# Patient Record
Sex: MALE | Race: WHITE | NOT HISPANIC OR LATINO
[De-identification: names, ages, dates, MRNs, and addresses within clinical notes are randomized per-mention and may not be internally consistent; named-entity substitution may affect disease eponyms.]

---

## 2019-08-06 ENCOUNTER — RX RENEWAL (OUTPATIENT)
Age: 64
End: 2019-08-06

## 2019-09-12 ENCOUNTER — APPOINTMENT (OUTPATIENT)
Dept: GASTROENTEROLOGY | Facility: CLINIC | Age: 64
End: 2019-09-12
Payer: MEDICAID

## 2019-09-12 VITALS
BODY MASS INDEX: 24.16 KG/M2 | DIASTOLIC BLOOD PRESSURE: 90 MMHG | SYSTOLIC BLOOD PRESSURE: 149 MMHG | WEIGHT: 145 LBS | HEART RATE: 61 BPM | HEIGHT: 65 IN

## 2019-09-12 PROCEDURE — 99214 OFFICE O/P EST MOD 30 MIN: CPT

## 2019-09-12 NOTE — PHYSICAL EXAM

## 2019-09-12 NOTE — HISTORY OF PRESENT ILLNESS
[de-identified] : Mr. JOSE R THOMASON is a 64 year old male with history of hepatitis C and cirrhosis. Patient currently feels well. There is no abdominal swelling or lower extremity edema. Patient denies any bleeding or fevers. Patient has not been treated.\par

## 2019-09-12 NOTE — ASSESSMENT
[FreeTextEntry1] : 65 yo male with history of hepatitis C and cirrhosis. Will check labs and ultrasound. Follow up in six months.

## 2019-09-24 ENCOUNTER — OUTPATIENT (OUTPATIENT)
Dept: OUTPATIENT SERVICES | Facility: HOSPITAL | Age: 64
LOS: 1 days | End: 2019-09-24
Payer: MEDICAID

## 2019-09-24 ENCOUNTER — APPOINTMENT (OUTPATIENT)
Dept: ULTRASOUND IMAGING | Facility: CLINIC | Age: 64
End: 2019-09-24
Payer: MEDICAID

## 2019-09-24 DIAGNOSIS — Z00.8 ENCOUNTER FOR OTHER GENERAL EXAMINATION: ICD-10-CM

## 2019-09-24 PROCEDURE — 76700 US EXAM ABDOM COMPLETE: CPT | Mod: 26

## 2019-09-24 PROCEDURE — 76700 US EXAM ABDOM COMPLETE: CPT

## 2019-09-26 LAB
AFP-TM SERPL-MCNC: 8.5 NG/ML
ALBUMIN SERPL ELPH-MCNC: 4.2 G/DL
ALP BLD-CCNC: 66 U/L
ALT SERPL-CCNC: 35 U/L
ANION GAP SERPL CALC-SCNC: 13 MMOL/L
AST SERPL-CCNC: 57 U/L
BASOPHILS # BLD AUTO: 0.03 K/UL
BASOPHILS NFR BLD AUTO: 0.9 %
BILIRUB SERPL-MCNC: 0.8 MG/DL
BUN SERPL-MCNC: 13 MG/DL
CALCIUM SERPL-MCNC: 9 MG/DL
CHLORIDE SERPL-SCNC: 103 MMOL/L
CO2 SERPL-SCNC: 24 MMOL/L
CREAT SERPL-MCNC: 0.85 MG/DL
EOSINOPHIL # BLD AUTO: 0.09 K/UL
EOSINOPHIL NFR BLD AUTO: 2.8 %
HCT VFR BLD CALC: 38.9 %
HGB BLD-MCNC: 13.4 G/DL
IMM GRANULOCYTES NFR BLD AUTO: 0 %
INR PPP: 1.17 RATIO
LYMPHOCYTES # BLD AUTO: 0.71 K/UL
LYMPHOCYTES NFR BLD AUTO: 22 %
MAN DIFF?: NORMAL
MCHC RBC-ENTMCNC: 33.4 PG
MCHC RBC-ENTMCNC: 34.4 GM/DL
MCV RBC AUTO: 97 FL
MONOCYTES # BLD AUTO: 0.33 K/UL
MONOCYTES NFR BLD AUTO: 10.2 %
NEUTROPHILS # BLD AUTO: 2.07 K/UL
NEUTROPHILS NFR BLD AUTO: 64.1 %
PLATELET # BLD AUTO: 72 K/UL
POTASSIUM SERPL-SCNC: 4.1 MMOL/L
PROT SERPL-MCNC: 6.8 G/DL
PT BLD: 13.4 SEC
RBC # BLD: 4.01 M/UL
RBC # FLD: 12.7 %
SODIUM SERPL-SCNC: 140 MMOL/L
WBC # FLD AUTO: 3.23 K/UL

## 2019-10-07 ENCOUNTER — RX RENEWAL (OUTPATIENT)
Age: 64
End: 2019-10-07

## 2019-10-08 ENCOUNTER — RX RENEWAL (OUTPATIENT)
Age: 64
End: 2019-10-08

## 2019-12-03 ENCOUNTER — RX RENEWAL (OUTPATIENT)
Age: 64
End: 2019-12-03

## 2020-02-23 ENCOUNTER — EMERGENCY (EMERGENCY)
Facility: HOSPITAL | Age: 65
LOS: 0 days | Discharge: HOME | End: 2020-02-23
Attending: EMERGENCY MEDICINE
Payer: MEDICARE

## 2020-02-23 VITALS
RESPIRATION RATE: 16 BRPM | HEART RATE: 66 BPM | SYSTOLIC BLOOD PRESSURE: 159 MMHG | DIASTOLIC BLOOD PRESSURE: 95 MMHG | OXYGEN SATURATION: 98 % | TEMPERATURE: 98 F

## 2020-02-23 VITALS — HEIGHT: 65 IN | WEIGHT: 134.92 LBS

## 2020-02-23 DIAGNOSIS — Z88.0 ALLERGY STATUS TO PENICILLIN: ICD-10-CM

## 2020-02-23 DIAGNOSIS — Z91.013 ALLERGY TO SEAFOOD: ICD-10-CM

## 2020-02-23 DIAGNOSIS — Z86.19 PERSONAL HISTORY OF OTHER INFECTIOUS AND PARASITIC DISEASES: ICD-10-CM

## 2020-02-23 DIAGNOSIS — K74.69 OTHER CIRRHOSIS OF LIVER: ICD-10-CM

## 2020-02-23 DIAGNOSIS — M54.5 LOW BACK PAIN: ICD-10-CM

## 2020-02-23 DIAGNOSIS — M54.16 RADICULOPATHY, LUMBAR REGION: ICD-10-CM

## 2020-02-23 PROCEDURE — 99284 EMERGENCY DEPT VISIT MOD MDM: CPT | Mod: 25

## 2020-02-23 PROCEDURE — 99284 EMERGENCY DEPT VISIT MOD MDM: CPT

## 2020-02-23 PROCEDURE — 96372 THER/PROPH/DIAG INJ SC/IM: CPT | Mod: XU

## 2020-02-23 RX ORDER — KETOROLAC TROMETHAMINE 30 MG/ML
30 SYRINGE (ML) INJECTION ONCE
Refills: 0 | Status: DISCONTINUED | OUTPATIENT
Start: 2020-02-23 | End: 2020-02-23

## 2020-02-23 RX ORDER — CYCLOBENZAPRINE HYDROCHLORIDE 10 MG/1
10 TABLET, FILM COATED ORAL ONCE
Refills: 0 | Status: COMPLETED | OUTPATIENT
Start: 2020-02-23 | End: 2020-02-23

## 2020-02-23 RX ORDER — MORPHINE SULFATE 50 MG/1
2 CAPSULE, EXTENDED RELEASE ORAL ONCE
Refills: 0 | Status: DISCONTINUED | OUTPATIENT
Start: 2020-02-23 | End: 2020-02-23

## 2020-02-23 RX ORDER — CYCLOBENZAPRINE HYDROCHLORIDE 10 MG/1
1 TABLET, FILM COATED ORAL
Qty: 15 | Refills: 0
Start: 2020-02-23

## 2020-02-23 RX ADMIN — Medication 30 MILLIGRAM(S): at 18:02

## 2020-02-23 RX ADMIN — Medication 60 MILLIGRAM(S): at 20:01

## 2020-02-23 RX ADMIN — CYCLOBENZAPRINE HYDROCHLORIDE 10 MILLIGRAM(S): 10 TABLET, FILM COATED ORAL at 18:02

## 2020-02-23 RX ADMIN — MORPHINE SULFATE 2 MILLIGRAM(S): 50 CAPSULE, EXTENDED RELEASE ORAL at 19:00

## 2020-02-23 NOTE — ED STATDOCS - CARE PROVIDER_API CALL
Patel Call; PhD)  Neurosurgery  [address], [address]nd [place] [address]  Phone: [phone]  Fax: [phone]  Follow Up Time:     Abel Foote (DO)  Orthopaedic Surgery  [address], [address]nd [place] [address]  Phone: [phone]  Fax: [phone]  Follow Up Time:

## 2020-02-23 NOTE — ED STATDOCS - NS_ ATTENDINGSCRIBEDETAILS _ED_A_ED_FT
I Jordan Robledo MD saw and examined the patient. Scribe documented for me and under my supervision. I have modified the scribe's documentation where necessary to reflect my history, physical exam and other relevant documentations pertinent to the care of the patient.

## 2020-02-23 NOTE — ED ADULT NURSE NOTE - OBJECTIVE STATEMENT
Patient presents to ED complaining of back pain. Patient complaining of LBP radiating down R leg x 6 days. Patient states pain is unrelieved by ibuprofen. Patient denies any recent falls or trauma.

## 2020-02-23 NOTE — ED STATDOCS - CARE PROVIDERS DIRECT ADDRESSES
[email or web address],[email or web address]

## 2020-02-23 NOTE — ED STATDOCS - PATIENT PORTAL LINK FT
You can access the FollowMyHealth Patient Portal offered by [facility] by registering at the following website: [email or web address]. By joining [facility]’s FollowMyHealth portal, you will also be able to view your health information using other applications (apps) compatible with our system.

## 2020-02-23 NOTE — ED STATDOCS - MUSCULOSKELETAL, MLM
TTP paralumbosacral spine. 5/5 strength to b/l LE. TTP paralumbosacral spine. 5/5 strength to b/l LE. no saddle anesthesia. 2+ pulses in bilateral dp arteries.

## 2020-02-23 NOTE — ED STATDOCS - OBJECTIVE STATEMENT
66 y/o male with PMHx of cirrhosis, Hep C presents to the ED c/o low back pain x6 days, now worsening and radiating down RLE and toward abd. Pain aggravated with standing/movement. No trauma or injury noted. No recent imaging. 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 Denies fever, chills, N/V. Hx of drug use. No EtOH use.

## 2020-02-23 NOTE — ED STATDOCS - ATTENDING CONTRIBUTION TO CARE
I Jordan Robledo MD saw and examined the patient. MLP saw and examined the patient under my supervision. I discussed the care of the patient with MLP and agree with MLP's plan, assessment and care of the patient while in the ED.

## 2020-02-23 NOTE — ED STATDOCS - PROGRESS NOTE DETAILS
64 y/o with PMH of cirrhosis, Hep C presents with right sided low back pain x 5 days. Denies trauma. 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 Pain radiates from right back to knee. +difficulty walking and sleeping. Denies fever, chills, nausea, vomiting, loss of bowel/bladder control. No change in pain with advil at home. PE: Well appearing. Cardiac: s1s2, RRR. Lungs: CTAB. ABdomen: NBS x4, soft, nontender. MSK: No midline spinal tenderness. +right lumbar paraspinal tenderness to palpation. No obvious deformity to lower extremities. +Pain with passive ROM in right knee and right hip. Patient able to ambulate with difficulty. A/P: Likely lumbar radicular pain/sciatica. Plan for analgesia, XRs, reassess. - Rafael Casiano PA-C Patient unable to tolerate lying on table for XRs. Returned to [facility] area to be medicated. At this time notes no change in pain following toradol & flexeril. Will provide morphine 2mg SC and send to xray. - Rafael Casiano PA-C Patient unable to tolerate positioning for XR while in ED after morphine. Patient ambulatory. Suspicion for fracture low due to no traumatic mechanism of injury and no history of malignancy. Had discussion with patient, will d/c with PO pain medication and orthopedic referral for MRI. Patient agreeable to plan. Return precautions provided. - Rafael Casiano PA-C

## 2020-02-23 NOTE — ED STATDOCS - NSFOLLOWUPINSTRUCTIONS_ED_ALL_ED_FT
Sciatica     Sciatica is pain, numbness, weakness, or tingling along your sciatic nerve. The sciatic nerve starts in the lower back and goes down the back of each leg. Sciatica happens when this nerve is pinched or has pressure put on it. Sciatica usually goes away on its own or with treatment. Sometimes, sciatica may keep coming back (recur).  Follow these instructions at home:  Medicines     Take over-the-counter and prescription medicines only as told by your doctor.Do not drive or use heavy machinery while taking prescription pain medicine.Managing pain     If directed, put ice on the affected area.  Put ice in a plastic bag.Place a towel between your skin and the bag.Leave the ice on for 20 minutes, 2–3 times a day.After icing, apply heat to the affected area before you exercise or as often as told by your doctor. Use the heat source that your doctor tells you to use, such as a moist heat pack or a heating pad.  Place a towel between your skin and the heat source.Leave the heat on for 20–30 minutes.Remove the heat if your skin turns bright red. This is especially important if you are unable to feel pain, heat, or cold. You may have a greater risk of getting burned.Activity     Return to your normal activities as told by your doctor. Ask your doctor what activities are safe for you.  Avoid activities that make your sciatica worse.Take short rests during the day. Rest in a lying or standing position. This is usually better than sitting to rest.  When you rest for a long time, do some physical activity or stretching between periods of rest.Avoid sitting for a long time without moving. Get up and move around at least one time each hour.Exercise and stretch regularly, as told by your doctor.Do not lift anything that is heavier than 10 lb (4.5 kg) while you have symptoms of sciatica.  Avoid lifting heavy things even when you do not have symptoms.Avoid lifting heavy things over and over.When you lift objects, always lift in a way that is safe for your body. To do this, you should:  Bend your knees.Keep the object close to your body.Avoid twisting.General instructions     Use good posture.  Avoid leaning forward when you are sitting.Avoid hunching over when you are standing.Stay at a healthy weight.Wear comfortable shoes that support your feet. Avoid wearing high heels.Avoid sleeping on a mattress that is too soft or too hard. You might have less pain if you sleep on a mattress that is firm enough to support your back.Keep all follow-up visits as told by your doctor. This is important.Contact a doctor if:  You have pain that:  Wakes you up when you are sleeping.Gets worse when you lie down.Is worse than the pain you have had in the past.Lasts longer than 4 weeks.You lose weight for without trying.Get help right away if:  You cannot control when you pee (urinate) or poop (have a bowel movement).You have weakness in any of these areas and it gets worse.  Lower back.Lower belly (pelvis).Butt (buttocks).Legs.You have redness or swelling of your back.You have a burning feeling when you pee.This information is not intended to replace advice given to you by your health care provider. Make sure you discuss any questions you have with your health care provider.

## 2020-02-23 NOTE — ED ADULT TRIAGE NOTE - CHIEF COMPLAINT QUOTE
Pt states he has 3 days of right sided low back pain radiating down to right knee, unrelieved by Advil. Denies injury

## 2020-03-01 PROCEDURE — G9001: CPT

## 2020-04-01 ENCOUNTER — OUTPATIENT (OUTPATIENT)
Dept: OUTPATIENT SERVICES | Facility: HOSPITAL | Age: 65
LOS: 1 days | End: 2020-04-01
Payer: MEDICARE

## 2020-04-03 DIAGNOSIS — Z71.89 OTHER SPECIFIED COUNSELING: ICD-10-CM

## 2020-04-03 PROBLEM — K74.60 UNSPECIFIED CIRRHOSIS OF LIVER: Chronic | Status: ACTIVE | Noted: 2020-03-12

## 2020-04-08 ENCOUNTER — RX RENEWAL (OUTPATIENT)
Age: 65
End: 2020-04-08

## 2020-08-04 ENCOUNTER — RX RENEWAL (OUTPATIENT)
Age: 65
End: 2020-08-04

## 2021-01-05 ENCOUNTER — RX RENEWAL (OUTPATIENT)
Age: 66
End: 2021-01-05

## 2021-03-31 ENCOUNTER — APPOINTMENT (OUTPATIENT)
Dept: GASTROENTEROLOGY | Facility: CLINIC | Age: 66
End: 2021-03-31
Payer: MEDICARE

## 2021-03-31 VITALS
DIASTOLIC BLOOD PRESSURE: 87 MMHG | BODY MASS INDEX: 24.32 KG/M2 | TEMPERATURE: 98 F | SYSTOLIC BLOOD PRESSURE: 148 MMHG | WEIGHT: 146 LBS | HEIGHT: 65 IN | HEART RATE: 63 BPM

## 2021-03-31 LAB — AFP-TM SERPL-MCNC: 53.8 NG/ML

## 2021-03-31 PROCEDURE — 99072 ADDL SUPL MATRL&STAF TM PHE: CPT

## 2021-03-31 PROCEDURE — 99214 OFFICE O/P EST MOD 30 MIN: CPT

## 2021-03-31 NOTE — ASSESSMENT
[FreeTextEntry1] : 66 yo male with history of hepatitis C and epigastric discomfort. Patient to stop NSAIDs and start PPI. Will obtain labs and ultrasound. Follow up in six weeks.

## 2021-03-31 NOTE — HISTORY OF PRESENT ILLNESS
[de-identified] : Mr. JOSE R THOMASON is a 66 year old male with history of hepatitis C. The patient has been on multiple medications for treatment of sacroiliitis. These included several courses of nonsteroidal agents. Patient has noted some epigastric discomfort since taking his medications. Patient has been unable to be treated for hepatitis C to date. No complaints of abdominal pain, confusion or bleeding.\par

## 2021-03-31 NOTE — PHYSICAL EXAM

## 2021-04-01 LAB
ALBUMIN SERPL ELPH-MCNC: 4.5 G/DL
ALP BLD-CCNC: 64 U/L
ALT SERPL-CCNC: 60 U/L
ANION GAP SERPL CALC-SCNC: 11 MMOL/L
AST SERPL-CCNC: 61 U/L
BASOPHILS # BLD AUTO: 0.01 K/UL
BASOPHILS NFR BLD AUTO: 0.1 %
BILIRUB SERPL-MCNC: 1.2 MG/DL
BUN SERPL-MCNC: 14 MG/DL
CALCIUM SERPL-MCNC: 9.2 MG/DL
CHLORIDE SERPL-SCNC: 97 MMOL/L
CO2 SERPL-SCNC: 25 MMOL/L
CREAT SERPL-MCNC: 0.92 MG/DL
EOSINOPHIL # BLD AUTO: 0.08 K/UL
EOSINOPHIL NFR BLD AUTO: 1.1 %
HCT VFR BLD CALC: 41.9 %
HGB BLD-MCNC: 14.8 G/DL
IMM GRANULOCYTES NFR BLD AUTO: 0.4 %
LYMPHOCYTES # BLD AUTO: 0.75 K/UL
LYMPHOCYTES NFR BLD AUTO: 10.2 %
MAN DIFF?: NORMAL
MCHC RBC-ENTMCNC: 34 PG
MCHC RBC-ENTMCNC: 35.3 GM/DL
MCV RBC AUTO: 96.3 FL
MONOCYTES # BLD AUTO: 0.71 K/UL
MONOCYTES NFR BLD AUTO: 9.7 %
NEUTROPHILS # BLD AUTO: 5.74 K/UL
NEUTROPHILS NFR BLD AUTO: 78.5 %
PLATELET # BLD AUTO: 95 K/UL
POTASSIUM SERPL-SCNC: 4.3 MMOL/L
PROT SERPL-MCNC: 7 G/DL
RBC # BLD: 4.35 M/UL
RBC # FLD: 12.1 %
SODIUM SERPL-SCNC: 134 MMOL/L
WBC # FLD AUTO: 7.32 K/UL

## 2021-04-06 LAB — HCV GENTYP BLD NAA+PROBE: NORMAL

## 2021-04-27 ENCOUNTER — OUTPATIENT (OUTPATIENT)
Dept: OUTPATIENT SERVICES | Facility: HOSPITAL | Age: 66
LOS: 1 days | End: 2021-04-27
Payer: MEDICARE

## 2021-04-27 ENCOUNTER — APPOINTMENT (OUTPATIENT)
Dept: ULTRASOUND IMAGING | Facility: CLINIC | Age: 66
End: 2021-04-27
Payer: MEDICARE

## 2021-04-27 DIAGNOSIS — R10.13 EPIGASTRIC PAIN: ICD-10-CM

## 2021-04-27 PROCEDURE — 76700 US EXAM ABDOM COMPLETE: CPT | Mod: 26

## 2021-04-27 PROCEDURE — 76700 US EXAM ABDOM COMPLETE: CPT

## 2021-05-07 ENCOUNTER — RX RENEWAL (OUTPATIENT)
Age: 66
End: 2021-05-07

## 2021-05-12 ENCOUNTER — APPOINTMENT (OUTPATIENT)
Dept: GASTROENTEROLOGY | Facility: CLINIC | Age: 66
End: 2021-05-12
Payer: MEDICARE

## 2021-05-12 VITALS
TEMPERATURE: 98 F | WEIGHT: 135 LBS | BODY MASS INDEX: 22.49 KG/M2 | HEIGHT: 65 IN | SYSTOLIC BLOOD PRESSURE: 144 MMHG | HEART RATE: 70 BPM | DIASTOLIC BLOOD PRESSURE: 81 MMHG

## 2021-05-12 DIAGNOSIS — R10.13 EPIGASTRIC PAIN: ICD-10-CM

## 2021-05-12 PROCEDURE — 99213 OFFICE O/P EST LOW 20 MIN: CPT

## 2021-05-12 NOTE — ASSESSMENT
[FreeTextEntry1] : 65 yo male with resolved abdominal pain. Will attempt treatment with Mavyret given chronic hepatitis C and cirrhosis. Follow up in three months.

## 2021-05-12 NOTE — HISTORY OF PRESENT ILLNESS
[de-identified] : Mr. JOSE R THOMASON is a 66 year old male with history of abdominal pain and cirrhosis. The patient has noted resolution of symptoms since stopping nonsteroidal medications and taking proton pump inhibitors. Peripheral edema has resolved as well. Laboratory tests and sonogram were consistent with cirrhosis. Patient had AFP that was mildly elevated.\par

## 2021-05-17 PROBLEM — D22.9 MULTIPLE BENIGN NEVI: Status: ACTIVE | Noted: 2021-05-17

## 2021-05-17 PROBLEM — D48.9 NEOPLASM OF UNCERTAIN BEHAVIOR: Status: ACTIVE | Noted: 2021-05-17

## 2021-05-17 PROBLEM — L81.4 LENTIGINES: Status: ACTIVE | Noted: 2021-05-17

## 2021-05-18 ENCOUNTER — NON-APPOINTMENT (OUTPATIENT)
Age: 66
End: 2021-05-18

## 2021-05-18 ENCOUNTER — APPOINTMENT (OUTPATIENT)
Dept: DERMATOLOGY | Facility: CLINIC | Age: 66
End: 2021-05-18
Payer: MEDICARE

## 2021-05-18 DIAGNOSIS — D22.9 MELANOCYTIC NEVI, UNSPECIFIED: ICD-10-CM

## 2021-05-18 DIAGNOSIS — D48.9 NEOPLASM OF UNCERTAIN BEHAVIOR, UNSPECIFIED: ICD-10-CM

## 2021-05-18 DIAGNOSIS — L81.4 OTHER MELANIN HYPERPIGMENTATION: ICD-10-CM

## 2021-05-18 PROCEDURE — 11102 TANGNTL BX SKIN SINGLE LES: CPT

## 2021-05-18 PROCEDURE — 99203 OFFICE O/P NEW LOW 30 MIN: CPT | Mod: 25

## 2021-05-18 NOTE — HISTORY OF PRESENT ILLNESS
[FreeTextEntry1] : spot [de-identified] : 66 year old male with spot on nose. present for years. scabs.

## 2021-05-18 NOTE — PHYSICAL EXAM
[FreeTextEntry3] : AAOx3, pleasant, NAD, no visual lymphadenopathy\par hair, scalp, face, nose, eyelids, ears, lips, oropharynx, neck, chest, abdomen, back, right arm, left arm, nails, and hands examined with all normal findings,\par pertinent findings include:\par \par multiple benign nevi and lentigines\par right nasal ala with red papule

## 2021-05-18 NOTE — ASSESSMENT
[FreeTextEntry1] : 1) benign findings as above- education\par \par 2) Shave bx location right nasal ala\par diagnosis: r/o BCC\par  \par Shave biopsy performed today over above location, risks and benefits discussed including incomplete removal, not enough tissue for diagnosis scarring and infection, informed consent obtained, pictures taken,  cleaned with alcohol and anesthetized with 1%lido+epi, 0.3 cc total, hemostasis obtained with monsels, vaseline and bandaid placed, tolerated well, wound care reviewed, specimen sent to pathology.\par \par

## 2021-05-21 ENCOUNTER — NON-APPOINTMENT (OUTPATIENT)
Age: 66
End: 2021-05-21

## 2021-05-24 ENCOUNTER — NON-APPOINTMENT (OUTPATIENT)
Age: 66
End: 2021-05-24

## 2021-05-24 LAB — CORE LAB BIOPSY: NORMAL

## 2021-06-22 LAB
HBV SURFACE AB SER QL: NONREACTIVE
HBV SURFACE AG SER QL: NONREACTIVE

## 2021-08-04 ENCOUNTER — APPOINTMENT (OUTPATIENT)
Dept: GASTROENTEROLOGY | Facility: CLINIC | Age: 66
End: 2021-08-04
Payer: MEDICARE

## 2021-08-04 ENCOUNTER — APPOINTMENT (OUTPATIENT)
Dept: NEUROSURGERY | Facility: CLINIC | Age: 66
End: 2021-08-04
Payer: MEDICARE

## 2021-08-04 VITALS
HEIGHT: 65 IN | WEIGHT: 120 LBS | BODY MASS INDEX: 19.99 KG/M2 | OXYGEN SATURATION: 99 % | TEMPERATURE: 96 F | SYSTOLIC BLOOD PRESSURE: 161 MMHG | HEART RATE: 62 BPM | DIASTOLIC BLOOD PRESSURE: 91 MMHG

## 2021-08-04 VITALS
HEART RATE: 71 BPM | WEIGHT: 130 LBS | SYSTOLIC BLOOD PRESSURE: 140 MMHG | BODY MASS INDEX: 21.66 KG/M2 | DIASTOLIC BLOOD PRESSURE: 90 MMHG | HEIGHT: 65 IN

## 2021-08-04 DIAGNOSIS — R20.2 PARESTHESIA OF SKIN: ICD-10-CM

## 2021-08-04 DIAGNOSIS — M25.561 PAIN IN RIGHT KNEE: ICD-10-CM

## 2021-08-04 DIAGNOSIS — M54.5 LOW BACK PAIN: ICD-10-CM

## 2021-08-04 PROCEDURE — 99213 OFFICE O/P EST LOW 20 MIN: CPT

## 2021-08-04 PROCEDURE — 99203 OFFICE O/P NEW LOW 30 MIN: CPT

## 2021-08-04 NOTE — HISTORY OF PRESENT ILLNESS
[de-identified] : Mr. JOSE R THOMASON is a 66 year old male with history of hepatitis C. Patient is unable to obtain therapy. Patient has no complaints of abdominal pain or lower extremity edema. No other significant changes in medical history. Patient has been avoiding nonsteroidal agents as discussed in the past. \par

## 2021-08-04 NOTE — ASSESSMENT
[FreeTextEntry1] : 67 yo male with stable cirrhosis from hepatitis C. Will obtain labs. Follow up in three months and obtain ultrasound at that time.

## 2021-08-05 ENCOUNTER — LABORATORY RESULT (OUTPATIENT)
Age: 66
End: 2021-08-05

## 2021-08-05 PROBLEM — M54.5 LUMBAGO: Status: ACTIVE | Noted: 2021-08-05

## 2021-08-05 PROBLEM — M25.561 RIGHT KNEE PAIN: Status: ACTIVE | Noted: 2021-08-05

## 2021-08-05 PROBLEM — R20.2 PARESTHESIA: Status: ACTIVE | Noted: 2021-08-05

## 2021-08-05 PROBLEM — M25.561 RIGHT KNEE PAIN: Status: RESOLVED | Noted: 2021-08-05 | Resolved: 2021-08-05

## 2021-08-05 NOTE — CONSULT LETTER
[Dear  ___] : Dear  [unfilled], [Courtesy Letter:] : I had the pleasure of seeing your patient, [unfilled], in my office today. [Sincerely,] : Sincerely, [FreeTextEntry2] : Caroline Arenas MD\par [address]\par [place] [address] [FreeTextEntry1] : Rafael Farmer is a 66-year-old male with a history of cirrhosis who presents today with lower back and mainly right knee symptoms.  Patient reports symptoms started in 2019 after lifting a heavy object.  Patient reports symptoms were exacerbated after shoveling snow in January 2020.  Patient reports a very mild and slight lower back intermittent soreness.  He reports a heavy throbbing constant 10 out of 10 pain localized to his right knee accompanied with numbness and tingling.  He denies any lower extremity shooting pains or weakness.  He denies any tripping over his feet or difficulties with walking.  He denies bowel or bladder dysfunction or saddle anesthesia.  Patient reports 10 mg of oxycodone provides him with relief of his right knee pain.  Although patients severe and main pain is in the right knee he has noted that when lying on his stomach this produces right lower back pain with a right leg pulling sensation pain.\par \par Patient is under the care of a pain management doctor which he has been seeing since January 2021.  Patient indicates he had received an x-ray of the knee at that time which was negative. He had underwent a total of 3 epidural injections the most recent in May 20, 2020.  This has not provided him with any relief.  He underwent 4 weeks of physical therapy with no relief noted.\par \par Patient underwent x-ray of the right knee performed on 6/4/2021 which indicated no evidence of acute displaced fracture. Patient underwent MRI of the lumbar spine performed in March 2020 at [facility].  Disc bulges and herniations noted throughout. \par \par Patient is alert and oriented.  No distress noted.  Sensation to light touch to lower extremities equal and normal.  Bilateral absent Achilles tendon reflexes.  Bilateral patellar reflexes present and equal.  Strength to bilateral legs 5/5.  Negative clonus. Negative straight leg test.\par \par I provided the patient with a prescription for an MRI of the right knee given that his severe pain is concentrated in his right knee. I have also provided the patient with a referral for orthopedic surgeon. We will follow over the phone once patient has been evaluated by the orthopedist surgeon.  Patient aware to call with any further questions or concerns or with any new or worsening symptoms. [FreeTextEntry3] : Samantha Staley, MSN, FNP-BC\par Nurse Practitioner \par Neurosurgery \par [address] [place] [address], [address]nd floor \[place] [address]\par Office: [phone]\par Fax: [phone]\par

## 2021-08-05 NOTE — REVIEW OF SYSTEMS
[Recent Weight Loss (___ Lbs)] : recent [unfilled] ~Ulb weight loss [Depression] : depression [Negative] : Heme/Lymph [Difficulty Walking] : no difficulty walking

## 2021-08-06 LAB
AFP-TM SERPL-MCNC: 122 NG/ML
ALBUMIN SERPL ELPH-MCNC: 4 G/DL
ALP BLD-CCNC: 82 U/L
ALT SERPL-CCNC: 60 U/L
ANION GAP SERPL CALC-SCNC: 14 MMOL/L
AST SERPL-CCNC: 73 U/L
BASOPHILS # BLD AUTO: 0.02 K/UL
BASOPHILS NFR BLD AUTO: 0.4 %
BILIRUB SERPL-MCNC: 0.9 MG/DL
BUN SERPL-MCNC: 13 MG/DL
CALCIUM SERPL-MCNC: 9.1 MG/DL
CHLORIDE SERPL-SCNC: 99 MMOL/L
CO2 SERPL-SCNC: 24 MMOL/L
CREAT SERPL-MCNC: 0.69 MG/DL
EOSINOPHIL # BLD AUTO: 0.07 K/UL
EOSINOPHIL NFR BLD AUTO: 1.5 %
HCT VFR BLD CALC: 41.3 %
HGB BLD-MCNC: 14.6 G/DL
IMM GRANULOCYTES NFR BLD AUTO: 0.4 %
INR PPP: 1.1 RATIO
LYMPHOCYTES # BLD AUTO: 0.63 K/UL
LYMPHOCYTES NFR BLD AUTO: 13.4 %
MAN DIFF?: NORMAL
MCHC RBC-ENTMCNC: 35.4 GM/DL
MCHC RBC-ENTMCNC: 36.1 PG
MCV RBC AUTO: 102.2 FL
MONOCYTES # BLD AUTO: 0.74 K/UL
MONOCYTES NFR BLD AUTO: 15.7 %
NEUTROPHILS # BLD AUTO: 3.23 K/UL
NEUTROPHILS NFR BLD AUTO: 68.6 %
PLATELET # BLD AUTO: 90 K/UL
POTASSIUM SERPL-SCNC: 4.7 MMOL/L
PROT SERPL-MCNC: 6.4 G/DL
PT BLD: 12.9 SEC
RBC # BLD: 4.04 M/UL
RBC # FLD: 13 %
SODIUM SERPL-SCNC: 137 MMOL/L
WBC # FLD AUTO: 4.71 K/UL

## 2021-09-10 ENCOUNTER — RX RENEWAL (OUTPATIENT)
Age: 66
End: 2021-09-10

## 2021-09-16 ENCOUNTER — NON-APPOINTMENT (OUTPATIENT)
Age: 66
End: 2021-09-16

## 2021-11-01 ENCOUNTER — RX RENEWAL (OUTPATIENT)
Age: 66
End: 2021-11-01

## 2021-11-04 ENCOUNTER — APPOINTMENT (OUTPATIENT)
Dept: GASTROENTEROLOGY | Facility: CLINIC | Age: 66
End: 2021-11-04
Payer: MEDICARE

## 2021-11-04 VITALS
WEIGHT: 125 LBS | DIASTOLIC BLOOD PRESSURE: 80 MMHG | HEART RATE: 66 BPM | SYSTOLIC BLOOD PRESSURE: 142 MMHG | BODY MASS INDEX: 20.83 KG/M2 | HEIGHT: 65 IN

## 2021-11-04 PROCEDURE — 99213 OFFICE O/P EST LOW 20 MIN: CPT

## 2021-11-04 NOTE — HISTORY OF PRESENT ILLNESS
[de-identified] : Mr. JOSE R THOMASON is a 66 year old male with history of cirrhosis and hepatitis C. Patient is almost completed her course of Harvoni. No complaints of pruritus, abdominal pain. Patient has been tolerating therapy well. There has been no significant changes medical history. No complaints of lower extremity edema.\par

## 2021-11-04 NOTE — ASSESSMENT
[FreeTextEntry1] : 65 yo male with history of compensated cirrhosis from hepatitis C. Will obtain labs, and follow up in two months to obtain viral load for hepatitis C.

## 2021-11-08 ENCOUNTER — LABORATORY RESULT (OUTPATIENT)
Age: 66
End: 2021-11-08

## 2021-11-09 LAB
AFP-TM SERPL-MCNC: 196 NG/ML
ALBUMIN SERPL ELPH-MCNC: 4.1 G/DL
ALP BLD-CCNC: 79 U/L
ALT SERPL-CCNC: 11 U/L
ANION GAP SERPL CALC-SCNC: 11 MMOL/L
AST SERPL-CCNC: 33 U/L
BASOPHILS # BLD AUTO: 0 K/UL
BASOPHILS NFR BLD AUTO: 0 %
BILIRUB SERPL-MCNC: 0.6 MG/DL
BUN SERPL-MCNC: 11 MG/DL
CALCIUM SERPL-MCNC: 8.9 MG/DL
CHLORIDE SERPL-SCNC: 102 MMOL/L
CO2 SERPL-SCNC: 26 MMOL/L
CREAT SERPL-MCNC: 0.77 MG/DL
EOSINOPHIL # BLD AUTO: 0.11 K/UL
EOSINOPHIL NFR BLD AUTO: 2.6 %
HCT VFR BLD CALC: 37.5 %
HGB BLD-MCNC: 13.1 G/DL
LYMPHOCYTES # BLD AUTO: 0.63 K/UL
LYMPHOCYTES NFR BLD AUTO: 14.8 %
MAN DIFF?: NORMAL
MCHC RBC-ENTMCNC: 34.9 GM/DL
MCHC RBC-ENTMCNC: 35.1 PG
MCV RBC AUTO: 100.5 FL
MONOCYTES # BLD AUTO: 0.41 K/UL
MONOCYTES NFR BLD AUTO: 9.6 %
NEUTROPHILS # BLD AUTO: 3.11 K/UL
NEUTROPHILS NFR BLD AUTO: 73 %
PLATELET # BLD AUTO: 81 K/UL
POTASSIUM SERPL-SCNC: 4.4 MMOL/L
PROT SERPL-MCNC: 6.4 G/DL
RBC # BLD: 3.73 M/UL
RBC # FLD: 12.1 %
SODIUM SERPL-SCNC: 138 MMOL/L
WBC # FLD AUTO: 4.26 K/UL

## 2021-11-10 LAB
HCV RNA SERPL NAA DL=5-ACNC: NOT DETECTED
HCV RNA SERPL NAA+PROBE-LOG IU: NOT DETECTED LOG10IU/ML

## 2021-11-29 ENCOUNTER — OUTPATIENT (OUTPATIENT)
Dept: OUTPATIENT SERVICES | Facility: HOSPITAL | Age: 66
LOS: 1 days | End: 2021-11-29
Payer: MEDICARE

## 2021-11-29 ENCOUNTER — APPOINTMENT (OUTPATIENT)
Dept: MRI IMAGING | Facility: CLINIC | Age: 66
End: 2021-11-29
Payer: MEDICARE

## 2021-11-29 DIAGNOSIS — R77.2 ABNORMALITY OF ALPHAFETOPROTEIN: ICD-10-CM

## 2021-11-29 PROCEDURE — G1004: CPT

## 2021-11-29 PROCEDURE — 74183 MRI ABD W/O CNTR FLWD CNTR: CPT | Mod: 26,MG

## 2021-11-29 PROCEDURE — A9585: CPT

## 2021-11-29 PROCEDURE — 74183 MRI ABD W/O CNTR FLWD CNTR: CPT | Mod: MG

## 2021-12-10 PROBLEM — C22.0 HEPATOMA: Status: ACTIVE | Noted: 2021-12-10

## 2021-12-13 ENCOUNTER — NON-APPOINTMENT (OUTPATIENT)
Age: 66
End: 2021-12-13

## 2021-12-13 ENCOUNTER — APPOINTMENT (OUTPATIENT)
Dept: TRANSPLANT | Facility: CLINIC | Age: 66
End: 2021-12-13
Payer: MEDICARE

## 2021-12-13 ENCOUNTER — APPOINTMENT (OUTPATIENT)
Dept: TRANSPLANT | Facility: CLINIC | Age: 66
End: 2021-12-13

## 2021-12-13 ENCOUNTER — LABORATORY RESULT (OUTPATIENT)
Age: 66
End: 2021-12-13

## 2021-12-13 VITALS
HEART RATE: 68 BPM | DIASTOLIC BLOOD PRESSURE: 84 MMHG | RESPIRATION RATE: 16 BRPM | BODY MASS INDEX: 21.33 KG/M2 | WEIGHT: 128 LBS | OXYGEN SATURATION: 100 % | SYSTOLIC BLOOD PRESSURE: 152 MMHG | TEMPERATURE: 96 F | HEIGHT: 65 IN

## 2021-12-13 DIAGNOSIS — C22.0 LIVER CELL CARCINOMA: ICD-10-CM

## 2021-12-13 PROCEDURE — 99205 OFFICE O/P NEW HI 60 MIN: CPT

## 2021-12-13 RX ORDER — LEDIPASVIR AND SOFOSBUVIR 90; 400 MG/1; MG/1
90-400 TABLET, FILM COATED ORAL DAILY
Qty: 84 | Refills: 0 | Status: DISCONTINUED | COMMUNITY
Start: 2021-06-08 | End: 2021-12-13

## 2021-12-13 RX ORDER — GLECAPREVIR AND PIBRENTASVIR 40; 100 MG/1; MG/1
100-40 TABLET, FILM COATED ORAL DAILY
Qty: 168 | Refills: 0 | Status: DISCONTINUED | COMMUNITY
Start: 2021-05-12 | End: 2021-12-13

## 2021-12-13 RX ORDER — PANTOPRAZOLE 40 MG/1
40 TABLET, DELAYED RELEASE ORAL DAILY
Qty: 60 | Refills: 3 | Status: DISCONTINUED | COMMUNITY
Start: 2021-03-31 | End: 2021-12-13

## 2021-12-13 RX ORDER — FUROSEMIDE 20 MG/1
20 TABLET ORAL DAILY
Qty: 60 | Refills: 3 | Status: DISCONTINUED | COMMUNITY
Start: 2021-05-04 | End: 2021-12-13

## 2021-12-13 NOTE — PHYSICAL EXAM
[Splenomegaly] : splenomegaly [Liver Palpable ___ Finger Breadths Below Costal Margin] : Liver edge was palpable [unfilled] finger breadths below costal margin [General Appearance - Alert] : alert [General Appearance - In No Acute Distress] : in no acute distress [Outer Ear] : the ears and nose were normal in appearance [Neck Appearance] : the appearance of the neck was normal [Apical Impulse] : the apical impulse was normal [Heart Rate And Rhythm] : heart rate was normal and rhythm regular [Heart Sounds] : normal S1 and S2 [Murmurs] : no murmurs [Arterial Pulses Carotid] : carotid pulses were normal with no bruits [Full Pulse] : the pedal pulses are present [No CVA Tenderness] : no ~M costovertebral angle tenderness [Cranial Nerves] : cranial nerves 2-12 were intact [Oriented To Time, Place, And Person] : oriented to person, place, and time [Affect] : the affect was normal [Scleral Icterus] : No Scleral Icterus [Spider Angioma] : No spider angioma(s) were observed [Asterixis] : no asterixis observed [Jaundice] : No jaundice [FreeTextEntry1] : 0.5cm umbilical hernia, reducible, spleen 2fb below CM Liver plalpable 1cm below cm

## 2021-12-13 NOTE — ASSESSMENT
[FreeTextEntry1] : Patient was explained alternatives, benefits and risk of liver transplantation, including but not limited to infection, bleeding, hepatic artery or portal vein thrombosis, primary dysfunction or primary non-function of the liver allograft, cardiopulmonary arrest, intra-operative death and other surgical, medical and psychosocial risks as outlined in the evaluation consent form. Patient understands these risks and is willing to proceed with liver transplantation.\par Patient was also explained the need to remain on lifelong anti-rejection medications.  We discussed the risks and side effects of immunosuppressive medications including, but not limited to infection, cancer, weight gain, new onset or worsening of diabetes or hypertension in a temporary or permanent state, kidney dysfunction, water retention, back pain, constipation, diarrhea, dizziness, headache, joint pain, loss of appetite, nausea, stomach pain or upset, trouble sleeping, vomiting, and mental or mood changes.  An overview of the follow-up protocol was reviewed including outpatient visits, blood tests and the potential for hospital readmission. The patient understands these risks and is willing to proceed with liver transplantation.\par \par We also discussed the available donor organ pool. We discussed the assessment of the  donor including age, cause of death, cardiac arrest, electrolyte abnormalities, course and length of hospital stay, use of vasopressors, hepatitis and HIV testing. We reviewed organ donor risk factors that could affect the success of the graft or the health of the patient, including, but not limited to, the donor's history, condition or age of the organs used, or the patient's potential risk of rosemary the human immunodeficiency virus and other infectious diseases if the disease cannot be detected in an infected donor.  We discussed and defined the option of an extended criteria for cadaveric donors (Hepatitis B core Ab positive donor, Hepatitis C Ab positive donor, steatosis, older donors, split livers and DCD donors) and early and late outcomes of graft survival after transplantation.\par \par The options of  donor liver transplantation vs. live donor liver transplantation were discussed. Differences between donation after cardiac death (DCD) compared to donation after brain death (DBD) liver transplantation were also fully disclosed and included lower graft survival rates, the increased incidence of hepatic artery stenosis, bile duct injury, ischemic cholangiopathy and increased re transplant rates seen in recipients of DCD donor livers.\par The [employer].S. Public Health Services (PHS) has set risk criteria for the transmission of viruses from donors to recipients.  A donor considered to have PHS risk criteria for a preventable viral infection based on their history which may suggest socially increased risk behaviors, was discussed. The patient is aware that if a PHS risk criteria donor is offered to the candidate, the transplant team will discuss the specifics to assist with making an informed decision. We discussed our post-transplant protocol for serology testing. \par \par The patient was made aware that it is against the law to be paid or to pay to donate an organ.  If any money was given or will be given in exchange for receiving an organ, the patient may be subject to criminal prosecution, and any insurance coverage may no longer apply and patient may become personally responsible for all the health care costs associated with the donation, and private health information will be available to law enforcement agencies.\par We explained that we store vessels for subsequent later use in transplants.  Again, we discussed the extensive testing done on  donors prior to donation, however despite an extensive evaluation on the donor, there is potential risk a recipient may contract infectious diseases (HIV or Hepatitis) or cancer if they cannot be detected in the donor. In the cases where PHS risk criteria donor vessels are used, we test the recipient per protocol between 1-2 months post-transplant for any potential infectious disease transmission. The patient understands that there is the potential of use of  donor vessels and PHS risk criteria donor vessels. The patient understands these risks and is willing to receive potentially PHS risk criteria donor vessels.\par \par We also discussed the MELD allocation system in depth and the one-year observed and expected patient and graft survival rates according to data from the Scientific Registry for Transplant Recipients. These center specific outcomes were provided in comparison to the national one-year averages as described in the evaluation consent forms.\par \par Prior to signing consent, patient was given an opportunity to ask questions.  After all concerns were addressed, informed consent was signed. Patient is aware that they may withdraw their consent for transplantation at any time.  Further, the patient is aware of the right to refuse an organ offer without penalty at any time. Pt has acknowledged understanding of PHS risk criteria donor. Did not consented to receive an organ from a Hepatitis C infected donor\par \par \par \par 65 yo male with history of compensated cirrhosis from hepatitis C treated with Harvoni 21. Had one decompensating event around  requiring LVP and had EV bleed.  Has never required another LVP, on nadolol. He has a newly dx HCC  4.0 cm seg 5/. He is oxycodone dependent d/t right knee and lower back pain. \par \par Consented for liver transplant \par \par Plan: Excellent candidate.\par -Will refer for liver directed therapy  , spoke with Dr. Fung\par -Plan to meet the team today \par -Will send for routine testing including dental, never had a colonoscopy.  \par

## 2021-12-13 NOTE — HISTORY OF PRESENT ILLNESS
[de-identified] : Mr. JOSE R THOMASON is a 66 year old male referred by Dr. Marcus Elias for newly dx HCC. \par He has a history of cirrhosis and hepatitis C. Patient just completed his course of Harvoni, his most  recent PCR (11/8/21) not detected.  \par \par He has decompensated disease in the form of ascites. He required 1 paracentesis (2L) about 2014, the same year he reports EV bleed at [facility] [place]..  Reports this was his last hospitalization.  \par \par Additional PMHx includes OA in right knee, lumbar disc degeneration requiring Opioids prescribed by a pain specialist.  BCC right nares. \par \par His most recent MRI shows a 4.0 x 2.9 x 3.0 cm HCC to segment 5/6.  . \par \par LFTs are normal, MELD 7. \par \par Covid Vaccinated Pfizer and booster (3/28/21, 4/19/21, 10/22/21) \par \par Social Hx:  Has a significant other Juanita, together 31 y/o dtr 2 grandchildren. Is a musician, plays bass guitar.  \par \par ETOH Hx: Quit drinking 25 yrs ago. 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 \par \par Additional small enhancing focus in the superior right hepatic lobe, which may represent a metastatic lesion or synchronous HCC.\par \par Location: Segment 5/6\par Size: 4.0 x 2.9 x 3.0 CM.\par Enhancement: Yes; Nodular/Masslike/Thick irregular.\par Washout: YES\par LI-RADS v 2018 Category: 5\par \par LABS 11/8/21 -Na 138\par TB 0.6, AST 33, ALT 11, ALP 79, Cr 0.77 \par  \par WBC 4.26, H/H 13.1/37.5, PLT 81

## 2021-12-17 LAB
ABO + RH PNL BLD: NORMAL
ABO + RH PNL BLD: NORMAL
AFP-TM SERPL-MCNC: 215 NG/ML
ALBUMIN SERPL ELPH-MCNC: 4.3 G/DL
ALP BLD-CCNC: 79 U/L
ALT SERPL-CCNC: 14 U/L
ANION GAP SERPL CALC-SCNC: 11 MMOL/L
AST SERPL-CCNC: 33 U/L
BASOPHILS # BLD AUTO: 0.02 K/UL
BASOPHILS NFR BLD AUTO: 0.5 %
BILIRUB SERPL-MCNC: 1 MG/DL
BUN SERPL-MCNC: 12 MG/DL
CALCIUM SERPL-MCNC: 9.4 MG/DL
CHLORIDE SERPL-SCNC: 99 MMOL/L
CHOLEST SERPL-MCNC: 166 MG/DL
CMV IGG SERPL QL: <0.2 U/ML
CMV IGG SERPL-IMP: NEGATIVE
CO2 SERPL-SCNC: 26 MMOL/L
COVID-19 SPIKE DOMAIN ANTIBODY INTERPRETATION: POSITIVE
CREAT SERPL-MCNC: 0.89 MG/DL
CREAT SPEC-SCNC: 85 MG/DL
CREAT/PROT UR: 0.1 RATIO
EBV DNA SERPL NAA+PROBE-ACNC: NOT DETECTED IU/ML
EBV EA AB SER IA-ACNC: >150 U/ML
EBV EA AB TITR SER IF: POSITIVE
EBV EA IGG SER QL IA: >600 U/ML
EBV EA IGG SER-ACNC: POSITIVE
EBV EA IGM SER IA-ACNC: POSITIVE
EBV PATRN SPEC IB-IMP: NORMAL
EBV VCA IGG SER IA-ACNC: >750 U/ML
EBV VCA IGM SER QL IA: 70.7 U/ML
EOSINOPHIL # BLD AUTO: 0.1 K/UL
EOSINOPHIL NFR BLD AUTO: 2.3 %
EPSTEIN-BARR VIRUS CAPSID ANTIGEN IGG: POSITIVE
ESTIMATED AVERAGE GLUCOSE: 100 MG/DL
GLUCOSE SERPL-MCNC: 130 MG/DL
HAV IGM SER QL: NONREACTIVE
HBA1C MFR BLD HPLC: 5.1 %
HBV CORE IGG+IGM SER QL: NONREACTIVE
HBV SURFACE AB SER QL: NONREACTIVE
HBV SURFACE AB SERPL IA-ACNC: <3 MIU/ML
HBV SURFACE AG SER QL: NONREACTIVE
HCT VFR BLD CALC: 38.9 %
HCV AB SER QL: REACTIVE
HCV S/CO RATIO: 13.42 S/CO
HDLC SERPL-MCNC: 60 MG/DL
HEPATITIS A IGG ANTIBODY: NONREACTIVE
HGB BLD-MCNC: 13.5 G/DL
HIV1+2 AB SPEC QL IA.RAPID: NONREACTIVE
HSV 1+2 IGG SER IA-IMP: NEGATIVE
HSV 1+2 IGG SER IA-IMP: POSITIVE
HSV1 IGG SER QL: 0.19 INDEX
HSV2 IGG SER QL: 1.96 INDEX
IMM GRANULOCYTES NFR BLD AUTO: 0.2 %
INR PPP: 1.19 RATIO
LDLC SERPL CALC-MCNC: 93 MG/DL
LYMPHOCYTES # BLD AUTO: 0.71 K/UL
LYMPHOCYTES NFR BLD AUTO: 16.2 %
MAGNESIUM SERPL-MCNC: 1.9 MG/DL
MAN DIFF?: NORMAL
MCHC RBC-ENTMCNC: 33.3 PG
MCHC RBC-ENTMCNC: 34.7 GM/DL
MCV RBC AUTO: 96 FL
MONOCYTES # BLD AUTO: 0.45 K/UL
MONOCYTES NFR BLD AUTO: 10.3 %
NEUTROPHILS # BLD AUTO: 3.1 K/UL
NEUTROPHILS NFR BLD AUTO: 70.5 %
NONHDLC SERPL-MCNC: 105 MG/DL
PHOSPHATE SERPL-MCNC: 3.3 MG/DL
PLATELET # BLD AUTO: 90 K/UL
POTASSIUM SERPL-SCNC: 4.6 MMOL/L
PROT SERPL-MCNC: 6.9 G/DL
PROT UR-MCNC: 6 MG/DL
PSA SERPL-MCNC: 1.95 NG/ML
PT BLD: 14 SEC
RBC # BLD: 4.05 M/UL
RBC # FLD: 12.5 %
RUBV IGG FLD-ACNC: 29.1 INDEX
RUBV IGG SER-IMP: POSITIVE
SARS-COV-2 AB SERPL IA-ACNC: >250 U/ML
SODIUM SERPL-SCNC: 135 MMOL/L
T GONDII AB SER-IMP: NEGATIVE
T GONDII IGG SER QL: <3 IU/ML
T PALLIDUM AB SER QL IA: NEGATIVE
TRIGL SERPL-MCNC: 63 MG/DL
VZV AB TITR SER: POSITIVE
VZV IGG SER IF-ACNC: >4000 INDEX
WBC # FLD AUTO: 4.39 K/UL

## 2021-12-21 ENCOUNTER — NON-APPOINTMENT (OUTPATIENT)
Age: 66
End: 2021-12-21

## 2021-12-21 LAB
DRUG ABUSE PANEL-9, SERUM: ABNORMAL
M TB IFN-G BLD-IMP: NEGATIVE
PHOSPHATIDYETHANOL (PETH), WHOLE BLOOD: NEGATIVE NG/ML
QUANTIFERON TB PLUS MITOGEN MINUS NIL: 3.61 IU/ML
QUANTIFERON TB PLUS NIL: 0.03 IU/ML
QUANTIFERON TB PLUS TB1 MINUS NIL: 0.04 IU/ML
QUANTIFERON TB PLUS TB2 MINUS NIL: 0.05 IU/ML

## 2021-12-22 ENCOUNTER — RX RENEWAL (OUTPATIENT)
Age: 66
End: 2021-12-22

## 2021-12-23 ENCOUNTER — APPOINTMENT (OUTPATIENT)
Dept: INTERVENTIONAL RADIOLOGY/VASCULAR | Facility: CLINIC | Age: 66
End: 2021-12-23
Payer: MEDICARE

## 2021-12-23 DIAGNOSIS — Z87.19 PERSONAL HISTORY OF OTHER DISEASES OF THE DIGESTIVE SYSTEM: ICD-10-CM

## 2021-12-23 DIAGNOSIS — Z87.898 PERSONAL HISTORY OF OTHER SPECIFIED CONDITIONS: ICD-10-CM

## 2021-12-23 DIAGNOSIS — M17.10 UNILATERAL PRIMARY OSTEOARTHRITIS, UNSPECIFIED KNEE: ICD-10-CM

## 2021-12-23 DIAGNOSIS — M51.26 OTHER INTERVERTEBRAL DISC DISPLACEMENT, LUMBAR REGION: ICD-10-CM

## 2021-12-23 PROCEDURE — 99204 OFFICE O/P NEW MOD 45 MIN: CPT | Mod: 95

## 2021-12-23 RX ORDER — CHLORHEXIDINE GLUCONATE 4 %
325 (65 FE) LIQUID (ML) TOPICAL DAILY
Refills: 0 | Status: ACTIVE | COMMUNITY
Start: 2021-12-23

## 2021-12-23 RX ORDER — PROPRANOLOL HYDROCHLORIDE 10 MG/1
10 TABLET ORAL
Qty: 180 | Refills: 5 | Status: DISCONTINUED | COMMUNITY
Start: 2019-10-07 | End: 2021-12-23

## 2021-12-28 NOTE — HISTORY OF PRESENT ILLNESS
[FreeTextEntry1] : This is a 66 year old male with pmhx of  OA in right knee, lumbar disc degeneration requiring Opioids prescribed by a pain specialist. BCC right nares, HCV, cirrhosis, and newly diagnosed HCC who presents today for consultation for liver directed therapy.  \par Pt has a history of cirrhosis and hepatitis C. Patient just completed his course of Harvoni, his most recent PCR (11/8/21) not detected. \par \par He has decompensated disease in the form of ascites. He required 1 paracentesis (2L) about 2014, the same year he reports EV bleed at [facility].. Reports this was his last hospitalization. \par  \par \par Social Hx: Has a significant other Juanita, together 31 y/o dtr 2 grandchildren. Is a musician, plays bass guitar. \par \par ETOH Hx: Quit drinking 25 yrs ago. 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

## 2021-12-28 NOTE — DATA REVIEWED
[FreeTextEntry1] : \par \par \par \par EXAM: MR ABDOMEN WAW IC\par \par \par PROCEDURE DATE: 11/29/2021\par \par \par \par INTERPRETATION: CLINICAL INFORMATION: History of elevated AFP. Rule out HCC.\par \par COMPARISON: Abdominal ultrasound 4/27/2021. Noncontrast CT abdomen pelvis 2/18/2014.\par \par CONTRAST/COMPLICATIONS:\par IV Contrast: Gadavist 10 cc administered 0 cc discarded\par Oral Contrast: NONE\par Complications: None reported at time of study completion\par \par PROCEDURE:\par MRI of the abdomen was performed.\par MRCP was performed.\par \par FINDINGS:\par LOWER CHEST: Within normal limits.\par \par LIVER: Markedly nodular and shrunken liver contour with prominence of the left lobe laterally, and mild heterogeneous T2 signal, compatible with cirrhosis. There are associated findings of portal hypertension, including recanalization of the periumbilical vein, lower esophageal/gastric varices, and mild splenomegaly.\par \par Within the inferior aspect of the right hepatic lobe (segment 5/6) there is a 4.0 x 2.9 x 3.0 cm T2 intermediate intensity lesion, which is hyperintense to the cirrhotic liver. This lesion demonstrates intracellular lipid and early arterial postcontrast enhancement with an enhancing pseudocapsule, with washout and restricted diffusion, and is compatible with hepatocellular carcinoma. An additional small 6 mm T2 hyper intense focus noted in the superior aspect of the right hepatic lobe (series 3 image 9), which demonstrates peripheral enhancement on early arterial and delayed imaging.\par BILE DUCTS: Normal caliber. Limited MRCP evaluation due to technique.\par GALLBLADDER: Within normal limits.\par SPLEEN: Enlarged to 13.8 cm in craniocaudad dimension. No visualized lesions.\par PANCREAS: The visualized lesions. Normal caliber main pancreatic duct.\par ADRENALS: Within normal limits.\par KIDNEYS/URETERS: Right renal upper pole cyst 1.9 cm with a single thin septation. Additional smaller left parapelvic 0.5 cm cyst. Otherwise, the kidneys are unremarkable in appearance, signal characteristics, and enhancement.\par \par VISUALIZED PORTIONS:\par BOWEL: Within normal limits.\par PERITONEUM: Small volume of perihepatic ascites.\par VESSELS: Nonaneurysmal abdominal aorta. The celiac artery, superior mesenteric artery, inferior mesenteric artery, and bilateral renal arteries are patent. 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.\par RETROPERITONEUM/LYMPH NODES: No lymphadenopathy.\par ABDOMINAL WALL: Within normal limits.\par BONES: Within normal limits.\par \par IMPRESSION:\par Findings of of cirrhosis and portal hypertension and a right hepatic lobe 4.0 cm mass with MR characteristics diagnostic for hepatocellular carcinoma.\par \par Additional small enhancing focus in the superior right hepatic lobe, which may represent a metastatic lesion or synchronous HCC.\par \par Location: Segment 5/6\par Size: 4.0 x 2.9 x 3.0 CM.\par Enhancement: Yes; Nodular/Masslike/Thick irregular.\par Washout: YES\par LI-RADS v 2018 Category: 5\par \par \par \par

## 2021-12-28 NOTE — ASSESSMENT
[FreeTextEntry1] : This is a 66 year old male with pmhx of  OA in right knee, lumbar disc degeneration requiring Opioids prescribed by a pain specialist. BCC right nares, HCV, cirrhosis, and newly diagnosed HCC who presents today for consultation for liver directed therapy. \par \par 1.  HCC\par - MR from 11/29/21 demonstrates a 4.0 2.9 x 3.0 cm Segment 5/6 lesion\par - imaging reviewed and discussed with pt\par - Recommending treatment with radioembolization\par - I reviewed the procedure, including the mapping angiogram, risks (infection, bleeding, elevated LFTs, MARK), benefits, alternatives, and expected post procedure course.\par - I reviewed post procedure radiation safety precautions\par - chest CT \par - NM bone scan\par - reviewed PPI/Medrol use, will send to pharmacy once have date planned\par \par 2.  Chronic Pain\par - on oxycodone for degenerative lumbar disease\par - will make anesthesia aware\par \par \par I have provided the patient the opportunity to ask questions and have answered them to their satisfaction.  They are encouraged to contact our office with any further questions, concerns, or issues.\par \par \par

## 2021-12-28 NOTE — ADDENDUM
[FreeTextEntry1] : I, Dr. Fung, personally performed the evaluation and management (E/M) services for this established patient who presents today with (a) new problem(s)/exacerbation of (an) existing condition(s).  That E/M includes conducting the examination, assessing all new/exacerbated conditions, and establishing a new plan of care.  Today, my ACP, [Nahed Perez NP], was here to observe my evaluation and management services for this new problem/exacerbated condition to be followed going forward.\par \par

## 2022-01-04 ENCOUNTER — OUTPATIENT (OUTPATIENT)
Dept: OUTPATIENT SERVICES | Facility: HOSPITAL | Age: 67
LOS: 1 days | End: 2022-01-04
Payer: COMMERCIAL

## 2022-01-04 ENCOUNTER — APPOINTMENT (OUTPATIENT)
Dept: CT IMAGING | Facility: CLINIC | Age: 67
End: 2022-01-04
Payer: COMMERCIAL

## 2022-01-04 DIAGNOSIS — Z00.8 ENCOUNTER FOR OTHER GENERAL EXAMINATION: ICD-10-CM

## 2022-01-04 DIAGNOSIS — K74.60 UNSPECIFIED CIRRHOSIS OF LIVER: ICD-10-CM

## 2022-01-04 PROCEDURE — 71250 CT THORAX DX C-: CPT | Mod: 26,ME

## 2022-01-04 PROCEDURE — G1004: CPT

## 2022-01-04 PROCEDURE — 71250 CT THORAX DX C-: CPT | Mod: ME

## 2022-01-05 ENCOUNTER — OUTPATIENT (OUTPATIENT)
Dept: OUTPATIENT SERVICES | Facility: HOSPITAL | Age: 67
LOS: 1 days | End: 2022-01-05
Payer: COMMERCIAL

## 2022-01-05 ENCOUNTER — TRANSCRIPTION ENCOUNTER (OUTPATIENT)
Age: 67
End: 2022-01-05

## 2022-01-05 ENCOUNTER — APPOINTMENT (OUTPATIENT)
Dept: PSYCHIATRY | Facility: HOSPITAL | Age: 67
End: 2022-01-05
Payer: COMMERCIAL

## 2022-01-05 ENCOUNTER — NON-APPOINTMENT (OUTPATIENT)
Age: 67
End: 2022-01-05

## 2022-01-05 ENCOUNTER — APPOINTMENT (OUTPATIENT)
Dept: CARDIOLOGY | Facility: CLINIC | Age: 67
End: 2022-01-05
Payer: COMMERCIAL

## 2022-01-05 VITALS
SYSTOLIC BLOOD PRESSURE: 160 MMHG | WEIGHT: 129 LBS | OXYGEN SATURATION: 98 % | DIASTOLIC BLOOD PRESSURE: 85 MMHG | HEART RATE: 61 BPM | BODY MASS INDEX: 22.85 KG/M2

## 2022-01-05 DIAGNOSIS — F41.9 ANXIETY DISORDER, UNSPECIFIED: ICD-10-CM

## 2022-01-05 DIAGNOSIS — Z71.89 OTHER SPECIFIED COUNSELING: ICD-10-CM

## 2022-01-05 PROCEDURE — 93000 ELECTROCARDIOGRAM COMPLETE: CPT | Mod: NC

## 2022-01-05 PROCEDURE — 90792 PSYCH DIAG EVAL W/MED SRVCS: CPT | Mod: GC

## 2022-01-05 PROCEDURE — 90792 PSYCH DIAG EVAL W/MED SRVCS: CPT

## 2022-01-05 PROCEDURE — 99205 OFFICE O/P NEW HI 60 MIN: CPT

## 2022-01-05 PROCEDURE — 99072 ADDL SUPL MATRL&STAF TM PHE: CPT

## 2022-01-05 PROCEDURE — 90792 PSYCH DIAG EVAL W/MED SRVCS: CPT | Mod: GT,GC

## 2022-01-06 ENCOUNTER — APPOINTMENT (OUTPATIENT)
Dept: GASTROENTEROLOGY | Facility: CLINIC | Age: 67
End: 2022-01-06
Payer: MEDICARE

## 2022-01-06 VITALS
HEART RATE: 61 BPM | HEIGHT: 63 IN | BODY MASS INDEX: 22.68 KG/M2 | WEIGHT: 128 LBS | DIASTOLIC BLOOD PRESSURE: 80 MMHG | SYSTOLIC BLOOD PRESSURE: 112 MMHG

## 2022-01-06 PROCEDURE — 99213 OFFICE O/P EST LOW 20 MIN: CPT

## 2022-01-06 RX ORDER — DICLOFENAC SODIUM 75 MG/1
75 TABLET, DELAYED RELEASE ORAL
Qty: 60 | Refills: 0 | Status: DISCONTINUED | COMMUNITY
Start: 2021-07-06

## 2022-01-06 NOTE — HISTORY OF PRESENT ILLNESS
[de-identified] : Mr. JOSE R THOMASON is a 66 year old male with history of hepatitis C related cirrhosis. Patient also had incidental finding of 4 cm HCC. Patient is currently in the midst of evaluation for liver transplantation. The patient has no complaints of abdominal pain, peripheral edema, rectal bleeding or weight loss. Patient is being planned for possible chemoembolization of the lesion prior to transplantation. Patient will require screening colonoscopy prior to transplantation. Patient has no complaints about his rectal bleeding. Patient never had prior screening.\par

## 2022-01-06 NOTE — ASSESSMENT
[FreeTextEntry1] : 65 yo male with history of cirrhosis/HCC. Will continue medications and arrange for screening colonoscopy.

## 2022-01-09 NOTE — HISTORY OF PRESENT ILLNESS
[FreeTextEntry1] : Mr. Rafael Farmer presents today for cardiovascular evaluation prior to possible liver transplant.\par His history is significant for cirrhosis, hepatitis C (s/p Harvoni therapy), hepatocellular carcinoma, arthritis and lumbar disc degeneration requiring opioid pain management. \par He has a remote history of alcohol abuse, but quit 25 years ago (self directed detox, 3 x seizures).\par In 2014 he was hospitalized in [facility] with decompensated liver disease and underwent paracentesis (2.5 L). Around the same time he was hospitalized for an esophageal varices bleed, but does not recall the details.\par Currently he attends physical therapy for his right knee and lower back. Although he does not have a dedicated exercise routine, he reports being constantly "on the move" caring for his 2 year old grandchild. \par He denies any chest discomfort, shortness of breath, palpitations, lightheadedness or syncope.\par He has no known history of coronary artery disease, diabetes mellitus, smoking cigarettes or family history of premature coronary artery disease. \par He does smoke marijuana socially.\par \par \par \par \par

## 2022-01-09 NOTE — DISCUSSION/SUMMARY
[FreeTextEntry1] : He is a 66 year old gentlemen who presents for cardiovascular evaluation prior to possible liver transplant.\par His blood pressure is within normal limits.\par His ECG shows sinus bradycardia (on propranolol) at 58 BPM and is otherwise unremarkable.\par An exercise stress echo will evaluate for any structural or ischemic heart disease. \par He is hesitant to agree to a dobutamine stress test as he is apprehensive about the injection, and understands that his chronic knee and back pain may be a limiting factor for treadmill exercise. \par \par Follow up pending results. \par

## 2022-01-09 NOTE — END OF VISIT
[FreeTextEntry3] : I saw the patient with Toya Morton NP and I agree with the findings as above.  [Time Spent: ___ minutes] : I have spent [unfilled] minutes of time on the encounter.

## 2022-01-09 NOTE — CARDIOLOGY SUMMARY
[de-identified] : 1/5/2022. Sinus bradycardia. 58 BPM. Normal axis. No prior ECG available for comparison.

## 2022-01-10 ENCOUNTER — NON-APPOINTMENT (OUTPATIENT)
Age: 67
End: 2022-01-10

## 2022-01-11 ENCOUNTER — APPOINTMENT (OUTPATIENT)
Dept: HEPATOLOGY | Facility: CLINIC | Age: 67
End: 2022-01-11
Payer: MEDICARE

## 2022-01-11 ENCOUNTER — NON-APPOINTMENT (OUTPATIENT)
Age: 67
End: 2022-01-11

## 2022-01-11 VITALS
RESPIRATION RATE: 16 BRPM | OXYGEN SATURATION: 99 % | WEIGHT: 128 LBS | HEART RATE: 53 BPM | SYSTOLIC BLOOD PRESSURE: 175 MMHG | BODY MASS INDEX: 22.68 KG/M2 | TEMPERATURE: 97.8 F | DIASTOLIC BLOOD PRESSURE: 102 MMHG | HEIGHT: 63 IN

## 2022-01-11 PROCEDURE — 99214 OFFICE O/P EST MOD 30 MIN: CPT

## 2022-01-14 NOTE — ASSESSMENT
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 Marcus Elias\par Transplant Surgery - Dr. Irving\par \par # HCC\par 11/29/21 MRI - 4.0 cm mass in R lobe segment 5/6, additional small 6 mm lesion in the superior R hepatic lobe.\par Treatment naive for HCC\par Rising AFP\par No metastatic disease on CT 1/4/22\par NM bone scan pending 1/31/22\par Treatment with IR pending completion of bone scan\par Ongoing transplant eval for MELD exception points\par \par # Transplant eval\par Needs colonoscopy, recommend adding on EGD w/ colonoscopy \par Pending treadmill stress for cardiac clearance\par Hx R basal cell carcinoma s/p Mohs 5/2021, do not think this will be a issue but may warrant dermatology recs\par

## 2022-01-14 NOTE — PHYSICAL EXAM
[Non-Tender] : non-tender [General Appearance - Alert] : alert [General Appearance - In No Acute Distress] : in no acute distress [Sclera] : the sclera and conjunctiva were normal [Neck Appearance] : the appearance of the neck was normal [Neck Cervical Mass (___cm)] : no neck mass was observed [Heart Rate And Rhythm] : heart rate was normal and rhythm regular [Bowel Sounds] : normal bowel sounds [Abdomen Tenderness] : non-tender [Abdomen Mass (___ Cm)] : no abdominal mass palpated [Abnormal Walk] : normal gait [Skin Color & Pigmentation] : normal skin color and pigmentation [Skin Turgor] : normal skin turgor [] : no rash [Oriented To Time, Place, And Person] : oriented to person, place, and time [Impaired Insight] : insight and judgment were intact [Affect] : the affect was normal [Scleral Icterus] : No Scleral Icterus [Spider Angioma] : No spider angioma(s) were observed [Abdominal  Ascites] : no ascites [Ascites Fluid Wave] : no ascites fluid wave [Ascites Tense] : ascites is not tense [Asterixis] : no asterixis observed [Jaundice] : No jaundice [Depression] : no depression

## 2022-01-14 NOTE — HISTORY OF PRESENT ILLNESS
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 \par He has a history of distant need for LVP in 2014 and EVB. \par He is pending colonoscopy w/ Dr. Elias in March 2022.\par MRI in 11/2021 shows 4 cm HCC. Labs show rising AFP.

## 2022-01-19 ENCOUNTER — NON-APPOINTMENT (OUTPATIENT)
Age: 67
End: 2022-01-19

## 2022-01-19 ENCOUNTER — APPOINTMENT (OUTPATIENT)
Dept: INFECTIOUS DISEASE | Facility: CLINIC | Age: 67
End: 2022-01-19
Payer: COMMERCIAL

## 2022-01-19 VITALS
HEART RATE: 59 BPM | OXYGEN SATURATION: 99 % | WEIGHT: 132 LBS | DIASTOLIC BLOOD PRESSURE: 105 MMHG | SYSTOLIC BLOOD PRESSURE: 173 MMHG | TEMPERATURE: 97.6 F | HEIGHT: 63 IN | BODY MASS INDEX: 23.39 KG/M2

## 2022-01-19 DIAGNOSIS — Z01.818 ENCOUNTER FOR OTHER PREPROCEDURAL EXAMINATION: ICD-10-CM

## 2022-01-19 PROCEDURE — G0010: CPT

## 2022-01-19 PROCEDURE — 90739 HEPB VACC 2/4 DOSE ADULT IM: CPT

## 2022-01-19 PROCEDURE — 99203 OFFICE O/P NEW LOW 30 MIN: CPT | Mod: 25

## 2022-01-19 PROCEDURE — 99072 ADDL SUPL MATRL&STAF TM PHE: CPT

## 2022-01-25 ENCOUNTER — APPOINTMENT (OUTPATIENT)
Dept: TRANSPLANT | Facility: CLINIC | Age: 67
End: 2022-01-25

## 2022-01-25 ENCOUNTER — APPOINTMENT (OUTPATIENT)
Dept: TRANSPLANT | Facility: CLINIC | Age: 67
End: 2022-01-25
Payer: MEDICARE

## 2022-01-25 PROCEDURE — 99215 OFFICE O/P EST HI 40 MIN: CPT | Mod: 95

## 2022-01-26 NOTE — PHYSICAL EXAM
[General Appearance - Alert] : alert [General Appearance - In No Acute Distress] : in no acute distress [Sclera] : the sclera and conjunctiva were normal [PERRL With Normal Accommodation] : pupils were equal in size, round, reactive to light [Extraocular Movements] : extraocular movements were intact [Outer Ear] : the ears and nose were normal in appearance [Neck Appearance] : the appearance of the neck was normal [Exaggerated Use Of Accessory Muscles For Inspiration] : no accessory muscle use [Auscultation Breath Sounds / Voice Sounds] : lungs were clear to auscultation bilaterally [Heart Rate And Rhythm] : heart rate was normal and rhythm regular [Heart Sounds] : normal S1 and S2 [Heart Sounds Gallop] : no gallops [Murmurs] : no murmurs [Heart Sounds Pericardial Friction Rub] : no pericardial rub [Edema] : there was no peripheral edema [Bowel Sounds] : normal bowel sounds [Abdomen Soft] : soft [Abdomen Tenderness] : non-tender [Abdomen Mass (___ Cm)] : no abdominal mass palpated [No Palpable Adenopathy] : no palpable adenopathy [Musculoskeletal - Swelling] : no joint swelling [Nail Clubbing] : no clubbing  or cyanosis of the fingernails [Motor Tone] : muscle strength and tone were normal [Skin Color & Pigmentation] : normal skin color and pigmentation [] : no rash [Affect] : the affect was normal

## 2022-01-26 NOTE — HISTORY OF PRESENT ILLNESS
[FreeTextEntry1] : 66 year old male PMH HCV Cirrhosis s/p Mavyret, HCC, Arthritis, remote EtOH use who presents to ID office for pretransplant evaluation. Denies known exposure to tuberculosis in the past. Denies prior positive latent tuberculosis testing. Denies chills, fevers or night sweats. Denies unintentional weight loss. Denies cough or SOB.  [] : No [de-identified] : [place] [de-identified] : No extended / significant travel  [de-identified] : [place] [de-identified] : Works as a musician in restaurant / bars, [de-identified] : No significant environmental exposures. Has a cat as a pet [de-identified] : Prior HCV s/p treatment

## 2022-01-26 NOTE — ASSESSMENT
[FreeTextEntry1] : 66 year old male PMH HCV Cirrhosis s/p Mavyret, HCC, Arthritis, remote EtOH use who presents to ID office for pretransplant evaluation. \par \par Denies known exposure to tuberculosis in the past.\par Quantiferon Gold Negative\par No risk factors for dimorphic fungal infection\par \par #Pre-Liver Transplant Evaluation\par --No absolute ID contraindication for liver transplantation\par \par #Encounter to Vaccinate Patient\par COVID19: s/p 3 doses of COVID19 Pfizer Vaccine. Dose 3 in 9/2021\par Influenza: Will require vaccination\par Pneumococcal: Will require pneumococcal vaccination\par HBV: Will administer dose 1 Heplisav. Dose 2 in 1 month\par HAV: Will administer dose 1 HAV vaccination. Dose 2 in 6 months\par Tdap:Will require\par Shingrix: Would benefit; will see if insurance covers\par \par Followup: 1 month

## 2022-01-27 NOTE — ASSESSMENT
[Good candidate] : a good candidate. We should proceed with our protocol for evaluation for liver transplantation. [FreeTextEntry1] : await cardiac stress testing and clearance\par screening colonoscopy\par IR - SIRT of hcc\par will require interval f/u of hcc post sirt\par

## 2022-01-27 NOTE — PLAN
[Patient agrees to proceed with the full evaluation for liver transplantation.] : Patient agrees to proceed with the full evaluation for liver transplantation.

## 2022-01-27 NOTE — HISTORY OF PRESENT ILLNESS
[Home] : at home, [unfilled] , at the time of the visit. [Medical Office: ([facility])___] : at the medical office located in  [Verbal consent obtained from patient] : the patient, [unfilled] [Hepatitis C Virus] : Hepatitis C Virus [Ascites] : Ascites [Variceal Hemorrhage] : Variceal Hemorrhage [History of HCC] : History of hepatocellular carcinoma [TextBox_42] :  Mr. JOSE R THOMASON is a 66 year old male referred by Dr. Marcus Elias for newly dx HCC. \par He has a history of cirrhosis and hepatitis C. Patient just completed his course of Harvoni, his most recent PCR (11/8/21) not detected. \par \par He has decompensated disease in the form of ascites. He required 1 paracentesis (2L) about 2014, the same year he reports EV bleed at [facility].. Reports this was his last hospitalization. \par \par Additional PMHx includes OA in right knee, lumbar disc degeneration requiring Opioids prescribed by a pain specialist. BCC right nares. \par \par His most recent MRI shows a 4.0 x 2.9 x 3.0 cm HCC to segment 5/6. . \par \par LFTs are normal, MELD 7. \par \par Covid Vaccinated Pfizer and booster (3/28/21, 4/19/21, 10/22/21) \par \par Social Hx: Has a significant other Juanita, together 31 y/o dtr 2 grandchildren. Is a musician, plays bass guitar. \par \par ETOH Hx: Quit drinking 25 yrs ago. 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 \par \par Additional small enhancing focus in the superior right hepatic lobe, which may represent a metastatic lesion or synchronous HCC.\par \par Location: Segment 5/6\par Size: 4.0 x 2.9 x 3.0 CM.\par Enhancement: Yes; Nodular/Masslike/Thick irregular.\par Washout: YES\par LI-RADS v 2018 Category: 5\par \par Labs 12/17/21 \par INR 1.19\par \par fior 1, AST 33, ALT 14, ALP 79, sCr 0.89, Na 135\par WCC 4.39, Hb 13.5, plt 90\par \par Interval hx:\par recently well\par ID consult:  vaccines administered, no ID contraindications to transplant\par awaiting SIRT of HCC with IR\par screening colonoscopy booked\par awaiting cardiac testing

## 2022-01-31 ENCOUNTER — APPOINTMENT (OUTPATIENT)
Dept: NUCLEAR MEDICINE | Facility: CLINIC | Age: 67
End: 2022-01-31
Payer: MEDICARE

## 2022-01-31 ENCOUNTER — OUTPATIENT (OUTPATIENT)
Dept: OUTPATIENT SERVICES | Facility: HOSPITAL | Age: 67
LOS: 1 days | End: 2022-01-31

## 2022-01-31 DIAGNOSIS — M51.26 OTHER INTERVERTEBRAL DISC DISPLACEMENT, LUMBAR REGION: ICD-10-CM

## 2022-01-31 PROCEDURE — 78306 BONE IMAGING WHOLE BODY: CPT | Mod: 26

## 2022-02-01 ENCOUNTER — NON-APPOINTMENT (OUTPATIENT)
Age: 67
End: 2022-02-01

## 2022-02-03 ENCOUNTER — APPOINTMENT (OUTPATIENT)
Dept: CARDIOLOGY | Facility: CLINIC | Age: 67
End: 2022-02-03

## 2022-02-04 ENCOUNTER — NON-APPOINTMENT (OUTPATIENT)
Age: 67
End: 2022-02-04

## 2022-02-07 ENCOUNTER — APPOINTMENT (OUTPATIENT)
Dept: HEPATOLOGY | Facility: CLINIC | Age: 67
End: 2022-02-07

## 2022-02-15 ENCOUNTER — APPOINTMENT (OUTPATIENT)
Dept: MRI IMAGING | Facility: CLINIC | Age: 67
End: 2022-02-15
Payer: MEDICARE

## 2022-02-15 ENCOUNTER — OUTPATIENT (OUTPATIENT)
Dept: OUTPATIENT SERVICES | Facility: HOSPITAL | Age: 67
LOS: 1 days | End: 2022-02-15
Payer: MEDICARE

## 2022-02-15 ENCOUNTER — APPOINTMENT (OUTPATIENT)
Dept: CV DIAGNOSITCS | Facility: HOSPITAL | Age: 67
End: 2022-02-15

## 2022-02-15 DIAGNOSIS — C22.0 LIVER CELL CARCINOMA: ICD-10-CM

## 2022-02-15 DIAGNOSIS — Z01.818 ENCOUNTER FOR OTHER PREPROCEDURAL EXAMINATION: ICD-10-CM

## 2022-02-15 PROCEDURE — C8930: CPT

## 2022-02-15 PROCEDURE — 93320 DOPPLER ECHO COMPLETE: CPT | Mod: 26

## 2022-02-15 PROCEDURE — 93018 CV STRESS TEST I&R ONLY: CPT

## 2022-02-15 PROCEDURE — 93016 CV STRESS TEST SUPVJ ONLY: CPT

## 2022-02-15 PROCEDURE — 74183 MRI ABD W/O CNTR FLWD CNTR: CPT | Mod: 26,ME

## 2022-02-15 PROCEDURE — 93325 DOPPLER ECHO COLOR FLOW MAPG: CPT | Mod: 26

## 2022-02-15 PROCEDURE — 74183 MRI ABD W/O CNTR FLWD CNTR: CPT | Mod: ME

## 2022-02-15 PROCEDURE — G1004: CPT

## 2022-02-15 PROCEDURE — 93325 DOPPLER ECHO COLOR FLOW MAPG: CPT

## 2022-02-15 PROCEDURE — 93320 DOPPLER ECHO COMPLETE: CPT

## 2022-02-15 PROCEDURE — A9585: CPT

## 2022-02-15 PROCEDURE — 93350 STRESS TTE ONLY: CPT | Mod: 26

## 2022-02-16 ENCOUNTER — OUTPATIENT (OUTPATIENT)
Dept: OUTPATIENT SERVICES | Facility: HOSPITAL | Age: 67
LOS: 1 days | End: 2022-02-16
Payer: MEDICARE

## 2022-02-16 VITALS
OXYGEN SATURATION: 97 % | DIASTOLIC BLOOD PRESSURE: 76 MMHG | HEIGHT: 62 IN | SYSTOLIC BLOOD PRESSURE: 143 MMHG | TEMPERATURE: 97 F | WEIGHT: 128.09 LBS | HEART RATE: 53 BPM | RESPIRATION RATE: 18 BRPM

## 2022-02-16 DIAGNOSIS — C22.0 LIVER CELL CARCINOMA: ICD-10-CM

## 2022-02-16 DIAGNOSIS — Z01.818 ENCOUNTER FOR OTHER PREPROCEDURAL EXAMINATION: ICD-10-CM

## 2022-02-16 LAB
ALBUMIN SERPL ELPH-MCNC: 4.3 G/DL — SIGNIFICANT CHANGE UP (ref 3.3–5)
ALP SERPL-CCNC: 78 U/L — SIGNIFICANT CHANGE UP (ref 40–120)
ALT FLD-CCNC: 13 U/L — SIGNIFICANT CHANGE UP (ref 10–45)
ANION GAP SERPL CALC-SCNC: 12 MMOL/L — SIGNIFICANT CHANGE UP (ref 5–17)
APTT BLD: 35.3 SEC — SIGNIFICANT CHANGE UP (ref 27.5–35.5)
AST SERPL-CCNC: 35 U/L — SIGNIFICANT CHANGE UP (ref 10–40)
BILIRUB SERPL-MCNC: 0.7 MG/DL — SIGNIFICANT CHANGE UP (ref 0.2–1.2)
BLD GP AB SCN SERPL QL: NEGATIVE — SIGNIFICANT CHANGE UP
BUN SERPL-MCNC: 12 MG/DL — SIGNIFICANT CHANGE UP (ref 7–23)
CALCIUM SERPL-MCNC: 9.3 MG/DL — SIGNIFICANT CHANGE UP (ref 8.4–10.5)
CHLORIDE SERPL-SCNC: 102 MMOL/L — SIGNIFICANT CHANGE UP (ref 96–108)
CO2 SERPL-SCNC: 24 MMOL/L — SIGNIFICANT CHANGE UP (ref 22–31)
CREAT SERPL-MCNC: 0.81 MG/DL — SIGNIFICANT CHANGE UP (ref 0.5–1.3)
GLUCOSE SERPL-MCNC: 130 MG/DL — HIGH (ref 70–99)
HCT VFR BLD CALC: 38.6 % — LOW (ref 39–50)
HGB BLD-MCNC: 13.2 G/DL — SIGNIFICANT CHANGE UP (ref 13–17)
INR BLD: 1.14 RATIO — SIGNIFICANT CHANGE UP (ref 0.88–1.16)
MCHC RBC-ENTMCNC: 32.5 PG — SIGNIFICANT CHANGE UP (ref 27–34)
MCHC RBC-ENTMCNC: 34.2 GM/DL — SIGNIFICANT CHANGE UP (ref 32–36)
MCV RBC AUTO: 95.1 FL — SIGNIFICANT CHANGE UP (ref 80–100)
NRBC # BLD: 0 /100 WBCS — SIGNIFICANT CHANGE UP (ref 0–0)
PLATELET # BLD AUTO: 91 K/UL — LOW (ref 150–400)
POTASSIUM SERPL-MCNC: 3.5 MMOL/L — SIGNIFICANT CHANGE UP (ref 3.5–5.3)
POTASSIUM SERPL-SCNC: 3.5 MMOL/L — SIGNIFICANT CHANGE UP (ref 3.5–5.3)
PROT SERPL-MCNC: 7 G/DL — SIGNIFICANT CHANGE UP (ref 6–8.3)
PROTHROM AB SERPL-ACNC: 13.6 SEC — SIGNIFICANT CHANGE UP (ref 10.6–13.6)
RBC # BLD: 4.06 M/UL — LOW (ref 4.2–5.8)
RBC # FLD: 12.9 % — SIGNIFICANT CHANGE UP (ref 10.3–14.5)
RH IG SCN BLD-IMP: POSITIVE — SIGNIFICANT CHANGE UP
SODIUM SERPL-SCNC: 138 MMOL/L — SIGNIFICANT CHANGE UP (ref 135–145)
WBC # BLD: 5 K/UL — SIGNIFICANT CHANGE UP (ref 3.8–10.5)
WBC # FLD AUTO: 5 K/UL — SIGNIFICANT CHANGE UP (ref 3.8–10.5)

## 2022-02-16 PROCEDURE — 85730 THROMBOPLASTIN TIME PARTIAL: CPT

## 2022-02-16 PROCEDURE — 86900 BLOOD TYPING SEROLOGIC ABO: CPT

## 2022-02-16 PROCEDURE — 86850 RBC ANTIBODY SCREEN: CPT

## 2022-02-16 PROCEDURE — 85610 PROTHROMBIN TIME: CPT

## 2022-02-16 PROCEDURE — 85027 COMPLETE CBC AUTOMATED: CPT

## 2022-02-16 PROCEDURE — 80053 COMPREHEN METABOLIC PANEL: CPT

## 2022-02-16 PROCEDURE — G0463: CPT

## 2022-02-16 PROCEDURE — 86901 BLOOD TYPING SEROLOGIC RH(D): CPT

## 2022-02-16 NOTE — H&P PST ADULT - HISTORY OF PRESENT ILLNESS
This is a 67 year old male with past medical history of Hepatitis C (treated with Harvoni) related Cirrhosis found to have 4cm HCC.     Scheduled mapping procedure and perfusion scan  with anesthesia in 2/24/22 with Dr. Diop         ** Covid test on 2/21 @ [facility]  ** Covid vaccinated- Pfizer series  ** Denies any history of COVID 19 infection This is a 67 year old male with past medical history of Hepatitis C (treated with Harvoni)  liver Cirrhosis, Etoh abuse ( last drink 20214), candidate for possible liver tranplantation however was found to have 4cm HCC on recent MRI. Now presenting to PST Scheduled mapping procedure and perfusion scan with anesthesia in 2/24/22 with Dr. Fung         ** Covid test on 2/21 @ [facility]  ** Covid vaccinated- Pfizer series  ** Denies any history of COVID 19 infection

## 2022-02-16 NOTE — H&P PST ADULT - NSANTHOSAYNRD_GEN_A_CORE
No. IFRAH screening performed.  STOP BANG Legend: 0-2 = LOW Risk; 3-4 = INTERMEDIATE Risk; 5-8 = HIGH Risk

## 2022-02-16 NOTE — H&P PST ADULT - FALL HARM RISK - UNIVERSAL INTERVENTIONS
Bed in lowest position, wheels locked, appropriate side rails in place/Call bell, personal items and telephone in reach/Instruct patient to call for assistance before getting out of bed or chair/Non-slip footwear when patient is out of bed/[place] to call system/Physically safe environment - no spills, clutter or unnecessary equipment/Purposeful Proactive Rounding/Room/bathroom lighting operational, light cord in reach

## 2022-02-16 NOTE — H&P PST ADULT - PROBLEM SELECTOR PLAN 1
Scheduled mapping procedure and perfusion scan  with anesthesia in 2/24/22 with Dr. Fung   Preop instructions and chlorhexidine soap given  Labs CBC CMP PTT/INR T&S performed in PST  Covid test on 2/21 @ [facility]

## 2022-02-16 NOTE — H&P PST ADULT - HEALTH CARE MAINTENANCE
Follows up GI every 3 months and PCP PRN  + Flu and Covid vaccinated  Colonoscopy Scheduled for 3/1/22

## 2022-02-16 NOTE — H&P PST ADULT - NSICDXPASTMEDICALHX_GEN_ALL_CORE_FT
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

## 2022-02-24 ENCOUNTER — OUTPATIENT (OUTPATIENT)
Dept: OUTPATIENT SERVICES | Facility: HOSPITAL | Age: 67
LOS: 1 days | End: 2022-02-24
Payer: MEDICARE

## 2022-02-24 ENCOUNTER — RESULT REVIEW (OUTPATIENT)
Age: 67
End: 2022-02-24

## 2022-02-24 ENCOUNTER — APPOINTMENT (OUTPATIENT)
Dept: NUCLEAR MEDICINE | Facility: HOSPITAL | Age: 67
End: 2022-02-24

## 2022-02-24 VITALS
HEIGHT: 64 IN | RESPIRATION RATE: 16 BRPM | OXYGEN SATURATION: 100 % | SYSTOLIC BLOOD PRESSURE: 160 MMHG | HEART RATE: 100 BPM | DIASTOLIC BLOOD PRESSURE: 88 MMHG | WEIGHT: 125 LBS | TEMPERATURE: 98 F

## 2022-02-24 VITALS
TEMPERATURE: 98 F | RESPIRATION RATE: 16 BRPM | SYSTOLIC BLOOD PRESSURE: 160 MMHG | HEIGHT: 64 IN | DIASTOLIC BLOOD PRESSURE: 88 MMHG | HEART RATE: 100 BPM | WEIGHT: 125 LBS | OXYGEN SATURATION: 100 %

## 2022-02-24 DIAGNOSIS — C22.0 LIVER CELL CARCINOMA: ICD-10-CM

## 2022-02-24 PROCEDURE — 36248 INS CATH ABD/L-EXT ART ADDL: CPT | Mod: 59

## 2022-02-24 PROCEDURE — 36245 INS CATH ABD/L-EXT ART 1ST: CPT | Mod: 59

## 2022-02-24 PROCEDURE — G1004: CPT

## 2022-02-24 PROCEDURE — 76937 US GUIDE VASCULAR ACCESS: CPT | Mod: 26

## 2022-02-24 PROCEDURE — C1894: CPT

## 2022-02-24 PROCEDURE — C1887: CPT

## 2022-02-24 PROCEDURE — 75726 ARTERY X-RAYS ABDOMEN: CPT

## 2022-02-24 PROCEDURE — 78830 RP LOCLZJ TUM SPECT W/CT 1: CPT | Mod: MG

## 2022-02-24 PROCEDURE — 36247 INS CATH ABD/L-EXT ART 3RD: CPT

## 2022-02-24 PROCEDURE — 78830 RP LOCLZJ TUM SPECT W/CT 1: CPT | Mod: 26,MG

## 2022-02-24 PROCEDURE — 75774 ARTERY X-RAY EACH VESSEL: CPT

## 2022-02-24 PROCEDURE — 76937 US GUIDE VASCULAR ACCESS: CPT

## 2022-02-24 PROCEDURE — C1769: CPT

## 2022-02-24 PROCEDURE — 75774 ARTERY X-RAY EACH VESSEL: CPT | Mod: 26

## 2022-02-24 PROCEDURE — C1760: CPT

## 2022-02-24 PROCEDURE — C1729: CPT

## 2022-02-24 PROCEDURE — 75726 ARTERY X-RAYS ABDOMEN: CPT | Mod: 26

## 2022-02-24 PROCEDURE — 36248 INS CATH ABD/L-EXT ART ADDL: CPT

## 2022-02-24 NOTE — ASU DISCHARGE PLAN (ADULT/PEDIATRIC) - NURSING INSTRUCTIONS
Please feel free to contact us at [phone] if any problems arise. After 6PM, Monday through Friday, on weekends and on holidays, please call [phone] and ask for the radiology resident on call to be paged.

## 2022-02-24 NOTE — PROCEDURE NOTE - PROCEDURE FINDINGS AND DETAILS
R CFA puncture. Mapping angiogram and coned beam CT performed with identification of abnormal tumor vascularity. Intraarterial injection of [id number] for lung shunt study. R groin closed w/ Celt device.

## 2022-02-24 NOTE — ASU PATIENT PROFILE, ADULT - TEACHING/LEARNING LEARNING PREFERENCES
audio/computer/internet/group instruction/individual instruction/skill demonstration/verbal instruction/written material

## 2022-02-24 NOTE — ASU PATIENT PROFILE, ADULT - FALL HARM RISK - UNIVERSAL INTERVENTIONS
Detail Level: Simple
Detail Level: Zone
Bed in lowest position, wheels locked, appropriate side rails in place/Call bell, personal items and telephone in reach/Instruct patient to call for assistance before getting out of bed or chair/Non-slip footwear when patient is out of bed/[place] to call system/Physically safe environment - no spills, clutter or unnecessary equipment/Purposeful Proactive Rounding/Room/bathroom lighting operational, light cord in reach

## 2022-02-24 NOTE — ASU DISCHARGE PLAN (ADULT/PEDIATRIC) - NS MD DC FALL RISK RISK
For information on Fall & Injury Prevention, visit: [email or web address] OR  [email or web address] OR  https://www.cdc.gov/steadi/patient.html

## 2022-02-24 NOTE — ASU PATIENT PROFILE, ADULT - NSICDXPASTMEDICALHX_GEN_ALL_CORE_FT
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

## 2022-02-24 NOTE — ASU DISCHARGE PLAN (ADULT/PEDIATRIC) - ASU DC SPECIAL INSTRUCTIONSFT
Hepatic angiogram/mapping discharge instructions:    - There may be a small area of bruising around the groin site.                   - You may resume your normal diet.  - You may resume your normal medications as instructed.  - Do not drive, engage in heavy lifting or strenuous activity, or drink any alcoholic beverages for the next 24 hours.     If you have a problem that you believe requires IMMEDIATE attention, please go to your NEAREST Emergency Room.    Notify your primary physician and/or Interventional Radiology IMMEDIATELY if you experience any of the following       - Fever of 101F or 38C       - Chills or Rigors/ Shakes       - Worsening Pain       - Blood soaked bandages or worsening bleeding       - Lightheadedness and/or dizziness upon standing       - Chest Pain/ Tightness       - Shortness of Breath       - Difficulty walking    Follow Up Instructions:   - Please call the IR Office at [phone] with any questions about your upcoming radioembolization (Y90) treatment.

## 2022-02-24 NOTE — PRE-ANESTHESIA EVALUATION ADULT - LAST ECHOCARDIOGRAM
Feb 15 2022 - Performed yesterday  Results pending Feb 15 2022 -stress echo - EF 62%; normal reponse

## 2022-02-25 ENCOUNTER — APPOINTMENT (OUTPATIENT)
Dept: CARDIOLOGY | Facility: CLINIC | Age: 67
End: 2022-02-25

## 2022-02-25 PROBLEM — M54.50 LOW BACK PAIN, UNSPECIFIED: Chronic | Status: ACTIVE | Noted: 2022-02-16

## 2022-02-25 PROBLEM — M51.26 OTHER INTERVERTEBRAL DISC DISPLACEMENT, LUMBAR REGION: Chronic | Status: ACTIVE | Noted: 2022-02-16

## 2022-02-25 PROBLEM — M19.90 UNSPECIFIED OSTEOARTHRITIS, UNSPECIFIED SITE: Chronic | Status: ACTIVE | Noted: 2022-02-16

## 2022-02-28 ENCOUNTER — LABORATORY RESULT (OUTPATIENT)
Age: 67
End: 2022-02-28

## 2022-03-01 ENCOUNTER — NON-APPOINTMENT (OUTPATIENT)
Age: 67
End: 2022-03-01

## 2022-03-03 ENCOUNTER — RESULT REVIEW (OUTPATIENT)
Age: 67
End: 2022-03-03

## 2022-03-03 ENCOUNTER — OUTPATIENT (OUTPATIENT)
Dept: OUTPATIENT SERVICES | Facility: HOSPITAL | Age: 67
LOS: 1 days | End: 2022-03-03
Payer: MEDICARE

## 2022-03-03 VITALS
HEART RATE: 55 BPM | DIASTOLIC BLOOD PRESSURE: 70 MMHG | SYSTOLIC BLOOD PRESSURE: 130 MMHG | OXYGEN SATURATION: 99 % | RESPIRATION RATE: 16 BRPM

## 2022-03-03 VITALS
DIASTOLIC BLOOD PRESSURE: 87 MMHG | HEART RATE: 79 BPM | OXYGEN SATURATION: 100 % | HEIGHT: 64 IN | RESPIRATION RATE: 16 BRPM | WEIGHT: 132.28 LBS | SYSTOLIC BLOOD PRESSURE: 152 MMHG | TEMPERATURE: 98 F

## 2022-03-03 DIAGNOSIS — C22.0 LIVER CELL CARCINOMA: ICD-10-CM

## 2022-03-03 LAB
ALBUMIN SERPL ELPH-MCNC: 4.5 G/DL — SIGNIFICANT CHANGE UP (ref 3.3–5)
ALP SERPL-CCNC: 76 U/L — SIGNIFICANT CHANGE UP (ref 40–120)
ALT FLD-CCNC: 13 U/L — SIGNIFICANT CHANGE UP (ref 10–45)
ANION GAP SERPL CALC-SCNC: 9 MMOL/L — SIGNIFICANT CHANGE UP (ref 5–17)
AST SERPL-CCNC: 35 U/L — SIGNIFICANT CHANGE UP (ref 10–40)
BILIRUB SERPL-MCNC: 0.8 MG/DL — SIGNIFICANT CHANGE UP (ref 0.2–1.2)
BUN SERPL-MCNC: 14 MG/DL — SIGNIFICANT CHANGE UP (ref 7–23)
CALCIUM SERPL-MCNC: 9.3 MG/DL — SIGNIFICANT CHANGE UP (ref 8.4–10.5)
CHLORIDE SERPL-SCNC: 101 MMOL/L — SIGNIFICANT CHANGE UP (ref 96–108)
CO2 SERPL-SCNC: 26 MMOL/L — SIGNIFICANT CHANGE UP (ref 22–31)
CREAT SERPL-MCNC: 0.89 MG/DL — SIGNIFICANT CHANGE UP (ref 0.5–1.3)
EGFR: 94 ML/MIN/1.73M2 — SIGNIFICANT CHANGE UP
GLUCOSE SERPL-MCNC: 123 MG/DL — HIGH (ref 70–99)
HCT VFR BLD CALC: 38.4 % — LOW (ref 39–50)
HGB BLD-MCNC: 13.5 G/DL — SIGNIFICANT CHANGE UP (ref 13–17)
INR BLD: 1.32 RATIO — HIGH (ref 0.88–1.16)
MCHC RBC-ENTMCNC: 33.1 PG — SIGNIFICANT CHANGE UP (ref 27–34)
MCHC RBC-ENTMCNC: 35.2 GM/DL — SIGNIFICANT CHANGE UP (ref 32–36)
MCV RBC AUTO: 94.1 FL — SIGNIFICANT CHANGE UP (ref 80–100)
NRBC # BLD: 0 /100 WBCS — SIGNIFICANT CHANGE UP (ref 0–0)
PLATELET # BLD AUTO: 92 K/UL — LOW (ref 150–400)
POTASSIUM SERPL-MCNC: 4 MMOL/L — SIGNIFICANT CHANGE UP (ref 3.5–5.3)
POTASSIUM SERPL-SCNC: 4 MMOL/L — SIGNIFICANT CHANGE UP (ref 3.5–5.3)
PROT SERPL-MCNC: 7.5 G/DL — SIGNIFICANT CHANGE UP (ref 6–8.3)
PROTHROM AB SERPL-ACNC: 15.3 SEC — HIGH (ref 10.5–13.4)
RBC # BLD: 4.08 M/UL — LOW (ref 4.2–5.8)
RBC # FLD: 12.9 % — SIGNIFICANT CHANGE UP (ref 10.3–14.5)
SODIUM SERPL-SCNC: 136 MMOL/L — SIGNIFICANT CHANGE UP (ref 135–145)
WBC # BLD: 4.58 K/UL — SIGNIFICANT CHANGE UP (ref 3.8–10.5)
WBC # FLD AUTO: 4.58 K/UL — SIGNIFICANT CHANGE UP (ref 3.8–10.5)

## 2022-03-03 PROCEDURE — C1760: CPT

## 2022-03-03 PROCEDURE — C1887: CPT

## 2022-03-03 PROCEDURE — C1769: CPT

## 2022-03-03 PROCEDURE — 76937 US GUIDE VASCULAR ACCESS: CPT

## 2022-03-03 PROCEDURE — 77336 RADIATION PHYSICS CONSULT: CPT

## 2022-03-03 PROCEDURE — 37243 VASC EMBOLIZE/OCCLUDE ORGAN: CPT

## 2022-03-03 PROCEDURE — C1894: CPT

## 2022-03-03 PROCEDURE — 79445 NUCLEAR RX INTRA-ARTERIAL: CPT

## 2022-03-03 PROCEDURE — 36247 INS CATH ABD/L-EXT ART 3RD: CPT

## 2022-03-03 PROCEDURE — 85610 PROTHROMBIN TIME: CPT

## 2022-03-03 PROCEDURE — 80053 COMPREHEN METABOLIC PANEL: CPT

## 2022-03-03 PROCEDURE — C2616: CPT

## 2022-03-03 PROCEDURE — 77300 RADIATION THERAPY DOSE PLAN: CPT

## 2022-03-03 PROCEDURE — 85027 COMPLETE CBC AUTOMATED: CPT

## 2022-03-03 PROCEDURE — 79445 NUCLEAR RX INTRA-ARTERIAL: CPT | Mod: 26

## 2022-03-03 PROCEDURE — 76937 US GUIDE VASCULAR ACCESS: CPT | Mod: 26

## 2022-03-03 RX ORDER — ACETAMINOPHEN 500 MG
1000 TABLET ORAL ONCE
Refills: 0 | Status: DISCONTINUED | OUTPATIENT
Start: 2022-03-03 | End: 2022-03-17

## 2022-03-03 NOTE — ASU DISCHARGE PLAN (ADULT/PEDIATRIC) - ASU DC SPECIAL INSTRUCTIONSFT
Post SIRT Discharge Instructions    - You underwent a radioembolization to treat your liver tumor (s) on 3/3/2022 by Dr. Fung.    - Monitor right groin site for symptoms of bleeding, hard area underneath the skin, bruising, numbness, intense pain, or inability to move.  If you have any of these symptoms, contact your doctor and seek immediate medical attention    - You may have mild abdominal pain, nausea, loss of appetite, fatigue, and/or low grade fever.  These are normal after your procedure and they should resolve within 3-5 days.  Please call Interventional Radiology if you have a fever > 102.0 F.  If you have persistent nausea, contact IR and we can prescribe anti-nausea medication.     - Please report chills, temperature > 102.0, persistent nausea or vomiting, severe pain, confusion, yellowing of the skin, or abdominal swelling.    - Please refer to the " Post S[facility] Radiation Safety Instructions" sheet that was provided.  Please adhere to them for 72 hours after your procedure.     - Continue your PPI (Nexium, Prilosec, Protonix) for 30 days post procedure.    - Start Medrol dose pack the day after your [facility] procedure (unless otherwise instructed).    - A follow up phone call will be performed the day after the procedure.     - Blood work is to be performed 2 weeks after your procedure (you will be given a paper prescription).  To locate the closest [facility] lab, call [phone]. If you have labwork performed at a NON-[facility] lab, please request that the results be faxed to [phone].      - Please call the IR booking department at [phone] to schedule a follow up visit with Dr Fung in 1 month.     - Follow up with your hepatologist or oncologist in 2 weeks.     - Post procedure imaging study is to be performed 2 months post procedure (CT or MRI).  You will be given a prescription for this at your initial 1 month follow up visit. Our booking office will obtain authorization from your insurance company, if required.    - Please contact the Nurse Practitioner with any questions or concerns directly at [phone] or [phone] from 8 am to 6 pm, Monday - Friday.  If after weekday hours, on weekends or holidays, please call [phone] and ask to speak with the "Interventional Radiology Resident on-call".  When you speak with the Resident, let them know what procedure you had and they will get in touch with the Interventional Radiology Attending Physician who is on-call.

## 2022-03-03 NOTE — ASU DISCHARGE PLAN (ADULT/PEDIATRIC) - NS MD DC FALL RISK RISK
For information on Fall & Injury Prevention, visit: [email or web address] OR  [email or web address] OR  https://www.cdc.gov/steadi/patient.html

## 2022-03-03 NOTE — PRE PROCEDURE NOTE - PRE PROCEDURE EVALUATION
Interventional Radiology    HPI: 67y Male with HCV cirrhosis and segment 5/6 HCC s/p mapping angiogram 2/24/22 presents for radioembolization of right hepatic mass.    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  - Plan for hepatic angio and radioembolization of right hepatic mass.    -Risks/Benefits/alternatives explained with the patient and/or healthcare proxy and witnessed informed consent obtained.

## 2022-03-03 NOTE — ASU PREOP CHECKLIST - LATEX ALLERGY
no -- continue Protonix 1mg/kg BID  --Consider to gradually decrease octreotide drip rate   --Continue to correct for bleeding diathesis   -- continue to monitor stool output and trend Hg  --Monitor hemodynamic status -- continue Protonix 1mg/kg BID  --Consider to gradually decrease octreotide drip rate   --Consider Meckel's scan   --Continue to correct for bleeding diathesis   -- continue to monitor stool output and trend Hg  --Monitor hemodynamic status  --if patient continue to have melanotic stools and drop in hemoglobin then patient might need surgical diagnotic and therapeutic intervention. -- continue Protonix 1mg/kg BID  --Consider to gradually decrease octreotide drip rate   --Consider Meckel's scan   --Continue to correct for bleeding diathesis   -- continue to monitor stool output and trend Hg  --Monitor hemodynamic status

## 2022-03-03 NOTE — PRE-ANESTHESIA EVALUATION ADULT - NSANTHPMHFT_GEN_ALL_CORE
This is a 67 year old male with past medical history of Hepatitis C (treated with Harvoni)  liver Cirrhosis, ETOH abuse ( last drink 2014), candidate for possible liver transplantation however was found to have 4cm HCC on recent MRI.

## 2022-03-03 NOTE — PROCEDURE NOTE - PROCEDURE FINDINGS AND DETAILS
Vascular & Interventional Radiology Post-Procedure Note    Pre-Procedure Diagnosis: Right hepatic HCC  Post-Procedure Diagnosis: Same as pre.  Indications for Procedure: Liver directed therapy    Attending: Dr. Fung  Resident: Dr. Osman    Procedure Details/Findings: Right common femoral artery access. Hepatic segment 5 arteriography demonstrates tumor vascularity. Successful radioembolization of hepatic segment 5 mass. Right groin hemostasis achieved with Celt closure device. Full report to follow.  Access (if applicable): Right common femoral artery    Complications: None  Estimated Blood Loss: Minimal  Specimen: None  Contrast: 25 cc Omni  Sedation: IV sedation by anesthesiology  Patient Condition/Disposition: Stable/ Recovery then home    Plan:  1.) Keep dressing clean and dry  2.) Follow-up with Dr. Fung in 1 month

## 2022-03-04 ENCOUNTER — APPOINTMENT (OUTPATIENT)
Dept: INTERVENTIONAL RADIOLOGY/VASCULAR | Facility: CLINIC | Age: 67
End: 2022-03-04

## 2022-03-04 PROCEDURE — G2012 BRIEF CHECK IN BY MD/QHP: CPT

## 2022-03-07 DIAGNOSIS — C22.0 LIVER CELL CARCINOMA: ICD-10-CM

## 2022-03-11 DIAGNOSIS — C22.0 LIVER CELL CARCINOMA: ICD-10-CM

## 2022-04-07 ENCOUNTER — LABORATORY RESULT (OUTPATIENT)
Age: 67
End: 2022-04-07

## 2022-04-08 ENCOUNTER — APPOINTMENT (OUTPATIENT)
Dept: INTERVENTIONAL RADIOLOGY/VASCULAR | Facility: CLINIC | Age: 67
End: 2022-04-08
Payer: MEDICARE

## 2022-04-08 LAB
ALBUMIN SERPL ELPH-MCNC: 4.4 G/DL
ALP BLD-CCNC: 84 U/L
ALT SERPL-CCNC: 14 U/L
ANION GAP SERPL CALC-SCNC: 12 MMOL/L
AST SERPL-CCNC: 37 U/L
BASOPHILS # BLD AUTO: 0.01 K/UL
BASOPHILS NFR BLD AUTO: 0.3 %
BILIRUB SERPL-MCNC: 0.6 MG/DL
BUN SERPL-MCNC: 9 MG/DL
CALCIUM SERPL-MCNC: 8.9 MG/DL
CHLORIDE SERPL-SCNC: 99 MMOL/L
CO2 SERPL-SCNC: 24 MMOL/L
CREAT SERPL-MCNC: 0.73 MG/DL
EGFR: 100 ML/MIN/1.73M2
EOSINOPHIL # BLD AUTO: 0.07 K/UL
EOSINOPHIL NFR BLD AUTO: 2.2 %
GLUCOSE SERPL-MCNC: 88 MG/DL
HCT VFR BLD CALC: 38.9 %
HGB BLD-MCNC: 13.3 G/DL
IMM GRANULOCYTES NFR BLD AUTO: 0.3 %
LYMPHOCYTES # BLD AUTO: 0.41 K/UL
LYMPHOCYTES NFR BLD AUTO: 12.6 %
MAN DIFF?: NORMAL
MCHC RBC-ENTMCNC: 33.1 PG
MCHC RBC-ENTMCNC: 34.2 GM/DL
MCV RBC AUTO: 96.8 FL
MONOCYTES # BLD AUTO: 0.47 K/UL
MONOCYTES NFR BLD AUTO: 14.5 %
NEUTROPHILS # BLD AUTO: 2.28 K/UL
NEUTROPHILS NFR BLD AUTO: 70.1 %
PLATELET # BLD AUTO: 99 K/UL
POTASSIUM SERPL-SCNC: 4.6 MMOL/L
PROT SERPL-MCNC: 6.7 G/DL
RBC # BLD: 4.02 M/UL
RBC # FLD: 13 %
SODIUM SERPL-SCNC: 136 MMOL/L
WBC # FLD AUTO: 3.25 K/UL

## 2022-04-08 PROCEDURE — 99214 OFFICE O/P EST MOD 30 MIN: CPT | Mod: 95

## 2022-04-15 ENCOUNTER — APPOINTMENT (OUTPATIENT)
Dept: INTERVENTIONAL RADIOLOGY/VASCULAR | Facility: CLINIC | Age: 67
End: 2022-04-15
Payer: MEDICARE

## 2022-04-15 ENCOUNTER — APPOINTMENT (OUTPATIENT)
Dept: GASTROENTEROLOGY | Facility: AMBULATORY MEDICAL SERVICES | Age: 67
End: 2022-04-15

## 2022-04-19 ENCOUNTER — APPOINTMENT (OUTPATIENT)
Dept: INFECTIOUS DISEASE | Facility: CLINIC | Age: 67
End: 2022-04-19

## 2022-05-02 ENCOUNTER — OUTPATIENT (OUTPATIENT)
Dept: OUTPATIENT SERVICES | Facility: HOSPITAL | Age: 67
LOS: 1 days | End: 2022-05-02
Payer: MEDICARE

## 2022-05-02 ENCOUNTER — APPOINTMENT (OUTPATIENT)
Dept: MRI IMAGING | Facility: CLINIC | Age: 67
End: 2022-05-02
Payer: MEDICARE

## 2022-05-02 DIAGNOSIS — C22.0 LIVER CELL CARCINOMA: ICD-10-CM

## 2022-05-02 PROCEDURE — G1004: CPT

## 2022-05-02 PROCEDURE — A9585: CPT

## 2022-05-02 PROCEDURE — 74183 MRI ABD W/O CNTR FLWD CNTR: CPT | Mod: ME

## 2022-05-02 PROCEDURE — 74183 MRI ABD W/O CNTR FLWD CNTR: CPT | Mod: 26,ME

## 2022-05-06 ENCOUNTER — APPOINTMENT (OUTPATIENT)
Dept: INTERVENTIONAL RADIOLOGY/VASCULAR | Facility: CLINIC | Age: 67
End: 2022-05-06
Payer: MEDICARE

## 2022-05-06 PROCEDURE — 99214 OFFICE O/P EST MOD 30 MIN: CPT | Mod: 95

## 2022-05-09 ENCOUNTER — LABORATORY RESULT (OUTPATIENT)
Age: 67
End: 2022-05-09

## 2022-05-10 ENCOUNTER — NON-APPOINTMENT (OUTPATIENT)
Age: 67
End: 2022-05-10

## 2022-05-10 LAB
AFP-TM SERPL-MCNC: 24.6 NG/ML
ALBUMIN SERPL ELPH-MCNC: 4.3 G/DL
ALP BLD-CCNC: 94 U/L
ALT SERPL-CCNC: 15 U/L
ANION GAP SERPL CALC-SCNC: 11 MMOL/L
AST SERPL-CCNC: 31 U/L
BASOPHILS # BLD AUTO: 0.06 K/UL
BASOPHILS NFR BLD AUTO: 1.7 %
BILIRUB SERPL-MCNC: 0.8 MG/DL
BUN SERPL-MCNC: 9 MG/DL
CALCIUM SERPL-MCNC: 9.2 MG/DL
CHLORIDE SERPL-SCNC: 101 MMOL/L
CO2 SERPL-SCNC: 26 MMOL/L
CREAT SERPL-MCNC: 0.78 MG/DL
EGFR: 98 ML/MIN/1.73M2
EOSINOPHIL # BLD AUTO: 0.06 K/UL
EOSINOPHIL NFR BLD AUTO: 1.8 %
GLUCOSE SERPL-MCNC: 93 MG/DL
HCT VFR BLD CALC: 36.9 %
HGB BLD-MCNC: 12.5 G/DL
INR PPP: 1.29 RATIO
LYMPHOCYTES # BLD AUTO: 0.31 K/UL
LYMPHOCYTES NFR BLD AUTO: 8.7 %
MAN DIFF?: NORMAL
MCHC RBC-ENTMCNC: 32.8 PG
MCHC RBC-ENTMCNC: 33.9 GM/DL
MCV RBC AUTO: 96.9 FL
MONOCYTES # BLD AUTO: 0.37 K/UL
MONOCYTES NFR BLD AUTO: 10.4 %
NEUTROPHILS # BLD AUTO: 2.72 K/UL
NEUTROPHILS NFR BLD AUTO: 77.4 %
PLATELET # BLD AUTO: 79 K/UL
POTASSIUM SERPL-SCNC: 4.2 MMOL/L
PROT SERPL-MCNC: 7 G/DL
PT BLD: 15 SEC
RBC # BLD: 3.81 M/UL
RBC # FLD: 13.3 %
SODIUM SERPL-SCNC: 138 MMOL/L
WBC # FLD AUTO: 3.51 K/UL

## 2022-05-26 ENCOUNTER — NON-APPOINTMENT (OUTPATIENT)
Age: 67
End: 2022-05-26

## 2022-06-17 ENCOUNTER — NON-APPOINTMENT (OUTPATIENT)
Age: 67
End: 2022-06-17

## 2022-07-05 ENCOUNTER — APPOINTMENT (OUTPATIENT)
Dept: GASTROENTEROLOGY | Facility: AMBULATORY MEDICAL SERVICES | Age: 67
End: 2022-07-05

## 2022-07-06 ENCOUNTER — APPOINTMENT (OUTPATIENT)
Dept: GASTROENTEROLOGY | Facility: HOSPITAL | Age: 67
End: 2022-07-06

## 2022-08-06 ENCOUNTER — APPOINTMENT (OUTPATIENT)
Dept: MRI IMAGING | Facility: CLINIC | Age: 67
End: 2022-08-06

## 2022-08-06 ENCOUNTER — OUTPATIENT (OUTPATIENT)
Dept: OUTPATIENT SERVICES | Facility: HOSPITAL | Age: 67
LOS: 1 days | End: 2022-08-06
Payer: MEDICARE

## 2022-08-06 DIAGNOSIS — C22.0 LIVER CELL CARCINOMA: ICD-10-CM

## 2022-08-06 PROCEDURE — G1004: CPT

## 2022-08-06 PROCEDURE — 74183 MRI ABD W/O CNTR FLWD CNTR: CPT | Mod: ME

## 2022-08-06 PROCEDURE — 74183 MRI ABD W/O CNTR FLWD CNTR: CPT | Mod: 26,ME

## 2022-08-06 PROCEDURE — A9585: CPT

## 2022-08-11 ENCOUNTER — APPOINTMENT (OUTPATIENT)
Dept: INTERVENTIONAL RADIOLOGY/VASCULAR | Facility: CLINIC | Age: 67
End: 2022-08-11

## 2022-08-11 PROCEDURE — 99214 OFFICE O/P EST MOD 30 MIN: CPT | Mod: 95

## 2022-08-11 RX ORDER — PANTOPRAZOLE 40 MG/1
40 TABLET, DELAYED RELEASE ORAL DAILY
Qty: 35 | Refills: 0 | Status: DISCONTINUED | COMMUNITY
Start: 2021-12-23 | End: 2022-08-11

## 2022-08-11 RX ORDER — METHYLPREDNISOLONE 4 MG/1
4 TABLET ORAL
Qty: 1 | Refills: 0 | Status: DISCONTINUED | COMMUNITY
Start: 2021-12-23 | End: 2022-08-11

## 2022-08-16 ENCOUNTER — APPOINTMENT (OUTPATIENT)
Dept: HEPATOLOGY | Facility: CLINIC | Age: 67
End: 2022-08-16

## 2022-11-07 ENCOUNTER — OUTPATIENT (OUTPATIENT)
Dept: OUTPATIENT SERVICES | Facility: HOSPITAL | Age: 67
LOS: 1 days | End: 2022-11-07
Payer: MEDICARE

## 2022-11-07 ENCOUNTER — APPOINTMENT (OUTPATIENT)
Dept: MRI IMAGING | Facility: CLINIC | Age: 67
End: 2022-11-07

## 2022-11-07 DIAGNOSIS — C22.0 LIVER CELL CARCINOMA: ICD-10-CM

## 2022-11-07 PROCEDURE — 74183 MRI ABD W/O CNTR FLWD CNTR: CPT

## 2022-11-07 PROCEDURE — A9585: CPT

## 2022-11-07 PROCEDURE — 74183 MRI ABD W/O CNTR FLWD CNTR: CPT | Mod: 26,MH

## 2022-11-11 ENCOUNTER — APPOINTMENT (OUTPATIENT)
Dept: INTERVENTIONAL RADIOLOGY/VASCULAR | Facility: CLINIC | Age: 67
End: 2022-11-11

## 2022-11-11 PROCEDURE — 99443: CPT | Mod: 95

## 2022-11-11 RX ORDER — POLYETHYLENE GLYCOL 3350 AND ELECTROLYTES WITH LEMON FLAVOR 236; 22.74; 6.74; 5.86; 2.97 G/4L; G/4L; G/4L; G/4L; G/4L
236 POWDER, FOR SOLUTION ORAL
Qty: 1 | Refills: 0 | Status: DISCONTINUED | COMMUNITY
Start: 2022-06-14 | End: 2022-11-11

## 2022-11-11 RX ORDER — OXYCODONE 5 MG/1
5 TABLET ORAL EVERY 8 HOURS
Refills: 0 | Status: ACTIVE | COMMUNITY
Start: 2021-12-06

## 2022-11-12 ENCOUNTER — LABORATORY RESULT (OUTPATIENT)
Age: 67
End: 2022-11-12

## 2022-11-15 LAB
AFP-TM SERPL-MCNC: 8 NG/ML
ALBUMIN SERPL ELPH-MCNC: 4.4 G/DL
ALP BLD-CCNC: 74 U/L
ALT SERPL-CCNC: 11 U/L
ANION GAP SERPL CALC-SCNC: 9 MMOL/L
AST SERPL-CCNC: 30 U/L
BASOPHILS # BLD AUTO: 0.03 K/UL
BASOPHILS NFR BLD AUTO: 1 %
BILIRUB SERPL-MCNC: 0.7 MG/DL
BUN SERPL-MCNC: 12 MG/DL
CALCIUM SERPL-MCNC: 9.3 MG/DL
CHLORIDE SERPL-SCNC: 101 MMOL/L
CO2 SERPL-SCNC: 26 MMOL/L
CREAT SERPL-MCNC: 0.88 MG/DL
EGFR: 94 ML/MIN/1.73M2
EOSINOPHIL # BLD AUTO: 0.13 K/UL
EOSINOPHIL NFR BLD AUTO: 4 %
GLUCOSE SERPL-MCNC: 112 MG/DL
HCT VFR BLD CALC: 37.3 %
HGB BLD-MCNC: 12.9 G/DL
INR PPP: 1.24 RATIO
LYMPHOCYTES # BLD AUTO: 0.39 K/UL
LYMPHOCYTES NFR BLD AUTO: 12 %
MAN DIFF?: NORMAL
MCHC RBC-ENTMCNC: 32.7 PG
MCHC RBC-ENTMCNC: 34.6 GM/DL
MCV RBC AUTO: 94.7 FL
MONOCYTES # BLD AUTO: 0.39 K/UL
MONOCYTES NFR BLD AUTO: 12 %
NEUTROPHILS # BLD AUTO: 2.29 K/UL
NEUTROPHILS NFR BLD AUTO: 71 %
PLATELET # BLD AUTO: 83 K/UL
POTASSIUM SERPL-SCNC: 4.4 MMOL/L
PROT SERPL-MCNC: 7 G/DL
PT BLD: 14.5 SEC
RBC # BLD: 3.94 M/UL
RBC # FLD: 13 %
SARS-COV-2 N GENE NPH QL NAA+PROBE: NOT DETECTED
SODIUM SERPL-SCNC: 135 MMOL/L
WBC # FLD AUTO: 3.22 K/UL

## 2022-11-16 ENCOUNTER — OUTPATIENT (OUTPATIENT)
Dept: OUTPATIENT SERVICES | Facility: HOSPITAL | Age: 67
LOS: 1 days | End: 2022-11-16
Payer: MEDICARE

## 2022-11-16 ENCOUNTER — APPOINTMENT (OUTPATIENT)
Dept: CT IMAGING | Facility: CLINIC | Age: 67
End: 2022-11-16

## 2022-11-16 DIAGNOSIS — C22.0 LIVER CELL CARCINOMA: ICD-10-CM

## 2022-11-16 PROCEDURE — 71250 CT THORAX DX C-: CPT

## 2022-11-16 PROCEDURE — 71250 CT THORAX DX C-: CPT | Mod: 26,MH

## 2022-11-30 ENCOUNTER — OUTPATIENT (OUTPATIENT)
Dept: OUTPATIENT SERVICES | Facility: HOSPITAL | Age: 67
LOS: 1 days | End: 2022-11-30

## 2022-11-30 ENCOUNTER — APPOINTMENT (OUTPATIENT)
Dept: NUCLEAR MEDICINE | Facility: CLINIC | Age: 67
End: 2022-11-30

## 2022-11-30 DIAGNOSIS — C22.0 LIVER CELL CARCINOMA: ICD-10-CM

## 2022-11-30 PROCEDURE — 78306 BONE IMAGING WHOLE BODY: CPT | Mod: 26

## 2022-12-05 ENCOUNTER — NON-APPOINTMENT (OUTPATIENT)
Age: 67
End: 2022-12-05

## 2022-12-06 NOTE — ASU PATIENT PROFILE, ADULT - NS PRO PASSIVE SMOKE EXP
-mildly tachycardic s/p , became more tachycardic just prior to syncopal episode to 130's  -denies chest pain or shortness of breath, denies history of cardiac problems or palpitations  -likely post-surgical with fluid shift    -monitor H/H  -monitor telemetry  -ECG and cardiac labs pending No

## 2022-12-07 DIAGNOSIS — Z01.818 ENCOUNTER FOR OTHER PREPROCEDURAL EXAMINATION: ICD-10-CM

## 2022-12-14 ENCOUNTER — RX RENEWAL (OUTPATIENT)
Age: 67
End: 2022-12-14

## 2022-12-19 ENCOUNTER — OUTPATIENT (OUTPATIENT)
Dept: OUTPATIENT SERVICES | Facility: HOSPITAL | Age: 67
LOS: 1 days | End: 2022-12-19
Payer: MEDICARE

## 2022-12-19 VITALS
TEMPERATURE: 98 F | DIASTOLIC BLOOD PRESSURE: 84 MMHG | WEIGHT: 119.93 LBS | HEART RATE: 60 BPM | RESPIRATION RATE: 16 BRPM | SYSTOLIC BLOOD PRESSURE: 160 MMHG | HEIGHT: 65 IN | OXYGEN SATURATION: 100 %

## 2022-12-19 DIAGNOSIS — C22.0 LIVER CELL CARCINOMA: ICD-10-CM

## 2022-12-19 DIAGNOSIS — Z01.818 ENCOUNTER FOR OTHER PREPROCEDURAL EXAMINATION: ICD-10-CM

## 2022-12-19 DIAGNOSIS — F41.9 ANXIETY DISORDER, UNSPECIFIED: ICD-10-CM

## 2022-12-19 LAB
ALBUMIN SERPL ELPH-MCNC: 4.1 G/DL — SIGNIFICANT CHANGE UP (ref 3.3–5)
ALP SERPL-CCNC: 68 U/L — SIGNIFICANT CHANGE UP (ref 40–120)
ALT FLD-CCNC: 13 U/L — SIGNIFICANT CHANGE UP (ref 10–45)
ANION GAP SERPL CALC-SCNC: 11 MMOL/L — SIGNIFICANT CHANGE UP (ref 5–17)
APTT BLD: 32.8 SEC — SIGNIFICANT CHANGE UP (ref 27.5–35.5)
AST SERPL-CCNC: 32 U/L — SIGNIFICANT CHANGE UP (ref 10–40)
BILIRUB SERPL-MCNC: 0.8 MG/DL — SIGNIFICANT CHANGE UP (ref 0.2–1.2)
BLD GP AB SCN SERPL QL: NEGATIVE — SIGNIFICANT CHANGE UP
BUN SERPL-MCNC: 9 MG/DL — SIGNIFICANT CHANGE UP (ref 7–23)
CALCIUM SERPL-MCNC: 8.9 MG/DL — SIGNIFICANT CHANGE UP (ref 8.4–10.5)
CHLORIDE SERPL-SCNC: 97 MMOL/L — SIGNIFICANT CHANGE UP (ref 96–108)
CO2 SERPL-SCNC: 27 MMOL/L — SIGNIFICANT CHANGE UP (ref 22–31)
CREAT SERPL-MCNC: 0.91 MG/DL — SIGNIFICANT CHANGE UP (ref 0.5–1.3)
EGFR: 92 ML/MIN/1.73M2 — SIGNIFICANT CHANGE UP
GLUCOSE SERPL-MCNC: 134 MG/DL — HIGH (ref 70–99)
HCT VFR BLD CALC: 39.4 % — SIGNIFICANT CHANGE UP (ref 39–50)
HGB BLD-MCNC: 13.6 G/DL — SIGNIFICANT CHANGE UP (ref 13–17)
INR BLD: 1.22 RATIO — HIGH (ref 0.88–1.16)
MCHC RBC-ENTMCNC: 32.6 PG — SIGNIFICANT CHANGE UP (ref 27–34)
MCHC RBC-ENTMCNC: 34.5 GM/DL — SIGNIFICANT CHANGE UP (ref 32–36)
MCV RBC AUTO: 94.5 FL — SIGNIFICANT CHANGE UP (ref 80–100)
NRBC # BLD: 0 /100 WBCS — SIGNIFICANT CHANGE UP (ref 0–0)
PLATELET # BLD AUTO: 93 K/UL — LOW (ref 150–400)
POTASSIUM SERPL-MCNC: 4.1 MMOL/L — SIGNIFICANT CHANGE UP (ref 3.5–5.3)
POTASSIUM SERPL-SCNC: 4.1 MMOL/L — SIGNIFICANT CHANGE UP (ref 3.5–5.3)
PROT SERPL-MCNC: 7.1 G/DL — SIGNIFICANT CHANGE UP (ref 6–8.3)
PROTHROM AB SERPL-ACNC: 14.2 SEC — HIGH (ref 10.5–13.4)
RBC # BLD: 4.17 M/UL — LOW (ref 4.2–5.8)
RBC # FLD: 12.7 % — SIGNIFICANT CHANGE UP (ref 10.3–14.5)
RH IG SCN BLD-IMP: POSITIVE — SIGNIFICANT CHANGE UP
SODIUM SERPL-SCNC: 135 MMOL/L — SIGNIFICANT CHANGE UP (ref 135–145)
WBC # BLD: 2.76 K/UL — LOW (ref 3.8–10.5)
WBC # FLD AUTO: 2.76 K/UL — LOW (ref 3.8–10.5)

## 2022-12-19 PROCEDURE — 86901 BLOOD TYPING SEROLOGIC RH(D): CPT

## 2022-12-19 PROCEDURE — 85610 PROTHROMBIN TIME: CPT

## 2022-12-19 PROCEDURE — 86850 RBC ANTIBODY SCREEN: CPT

## 2022-12-19 PROCEDURE — 93005 ELECTROCARDIOGRAM TRACING: CPT

## 2022-12-19 PROCEDURE — 86900 BLOOD TYPING SEROLOGIC ABO: CPT

## 2022-12-19 PROCEDURE — 85730 THROMBOPLASTIN TIME PARTIAL: CPT

## 2022-12-19 PROCEDURE — 85027 COMPLETE CBC AUTOMATED: CPT

## 2022-12-19 PROCEDURE — G0463: CPT

## 2022-12-19 PROCEDURE — 80053 COMPREHEN METABOLIC PANEL: CPT

## 2022-12-19 PROCEDURE — 93010 ELECTROCARDIOGRAM REPORT: CPT

## 2022-12-19 RX ORDER — PANTOPRAZOLE SODIUM 20 MG/1
1 TABLET, DELAYED RELEASE ORAL
Qty: 0 | Refills: 0 | DISCHARGE

## 2022-12-19 RX ORDER — OXYCODONE HYDROCHLORIDE 5 MG/1
1 TABLET ORAL
Qty: 0 | Refills: 0 | DISCHARGE

## 2022-12-19 NOTE — H&P PST ADULT - NSICDXPASTMEDICALHX_GEN_ALL_CORE_FT
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

## 2022-12-19 NOTE — H&P PST ADULT - HISTORY OF PRESENT ILLNESS
This is a 67 year old male with past medical history of Hepatitis C (treated with Harvoni)  liver Cirrhosis, Etoh abuse ( last drink 20214), candidate for possible liver tranplantation however was found to have 4cm HCC on recent MRI. Now presenting to PST Scheduled mapping procedure and perfusion scan with anesthesia in 2/24/22 with Dr. Fung         ** Covid test on 2/21 @ [facility]  ** Covid vaccinated- Pfizer series  ** Denies any history of COVID 19 infection This is a 67 year old male with past medical history of Hepatitis C (treated with Harvoni)  liver Cirrhosis, Etoh abuse ( last drink 20214), candidate for possible liver tranplantation however was found to have 4cm HCC on recent MRI. Pt underwent  mapping procedure and perfusion scan with anesthesia in 2/24/22 had f/u IR evaluation- scheduled for mapping procedure with Dr. Fung on 12/21/22    **Denies any fever, chills, N/V, abdominal pain or sick contacts  ** Covid test on 12/19/22 @ [facility]  ** Covid vaccinated- Pfizer series   This is a 67 year old male with past medical history of Hepatitis C (treated with Harvoni)  liver Cirrhosis, Etoh abuse ( last drink 2014), was found to have 4cm HCC on recent MRI. Pt underwent  mapping procedure and perfusion scan with anesthesia in 2/24/22 had f/u IR evaluation- scheduled for mapping procedure with Dr. Fung on 12/21/22    **Denies any fever, chills, N/V, abdominal pain or sick contacts  ** Covid test on 12/19/22 @ [facility]  ** Covid vaccinated- Pfizer series

## 2022-12-20 LAB — SARS-COV-2 N GENE NPH QL NAA+PROBE: DETECTED

## 2022-12-21 ENCOUNTER — APPOINTMENT (OUTPATIENT)
Dept: NUCLEAR MEDICINE | Facility: HOSPITAL | Age: 67
End: 2022-12-21

## 2023-01-04 PROBLEM — F41.9 ANXIETY DISORDER, UNSPECIFIED: Chronic | Status: ACTIVE | Noted: 2022-12-19

## 2023-01-04 PROBLEM — D53.9 NUTRITIONAL ANEMIA, UNSPECIFIED: Chronic | Status: ACTIVE | Noted: 2022-12-19

## 2023-01-04 PROBLEM — Z86.2 PERSONAL HISTORY OF DISEASES OF THE BLOOD AND BLOOD-FORMING ORGANS AND CERTAIN DISORDERS INVOLVING THE IMMUNE MECHANISM: Chronic | Status: ACTIVE | Noted: 2022-12-19

## 2023-01-04 PROBLEM — I85.00 ESOPHAGEAL VARICES WITHOUT BLEEDING: Chronic | Status: ACTIVE | Noted: 2022-12-19

## 2023-01-11 ENCOUNTER — TRANSCRIPTION ENCOUNTER (OUTPATIENT)
Age: 68
End: 2023-01-11

## 2023-01-11 ENCOUNTER — RESULT REVIEW (OUTPATIENT)
Age: 68
End: 2023-01-11

## 2023-01-11 ENCOUNTER — APPOINTMENT (OUTPATIENT)
Dept: NUCLEAR MEDICINE | Facility: HOSPITAL | Age: 68
End: 2023-01-11

## 2023-01-11 ENCOUNTER — OUTPATIENT (OUTPATIENT)
Dept: OUTPATIENT SERVICES | Facility: HOSPITAL | Age: 68
LOS: 1 days | End: 2023-01-11
Payer: MEDICARE

## 2023-01-11 VITALS
SYSTOLIC BLOOD PRESSURE: 165 MMHG | DIASTOLIC BLOOD PRESSURE: 95 MMHG | WEIGHT: 125 LBS | TEMPERATURE: 98 F | HEIGHT: 65 IN | OXYGEN SATURATION: 100 % | RESPIRATION RATE: 18 BRPM

## 2023-01-11 VITALS
RESPIRATION RATE: 18 BRPM | HEIGHT: 65 IN | WEIGHT: 125 LBS | OXYGEN SATURATION: 100 % | DIASTOLIC BLOOD PRESSURE: 95 MMHG | TEMPERATURE: 98 F | SYSTOLIC BLOOD PRESSURE: 165 MMHG

## 2023-01-11 DIAGNOSIS — C22.0 LIVER CELL CARCINOMA: ICD-10-CM

## 2023-01-11 LAB
ALBUMIN SERPL ELPH-MCNC: 4.7 G/DL — SIGNIFICANT CHANGE UP (ref 3.3–5)
ALP SERPL-CCNC: 76 U/L — SIGNIFICANT CHANGE UP (ref 40–120)
ALT FLD-CCNC: 11 U/L — SIGNIFICANT CHANGE UP (ref 10–45)
ANION GAP SERPL CALC-SCNC: 12 MMOL/L — SIGNIFICANT CHANGE UP (ref 5–17)
APTT BLD: 34.4 SEC — SIGNIFICANT CHANGE UP (ref 27.5–35.5)
AST SERPL-CCNC: 31 U/L — SIGNIFICANT CHANGE UP (ref 10–40)
BILIRUB SERPL-MCNC: 1 MG/DL — SIGNIFICANT CHANGE UP (ref 0.2–1.2)
BLD GP AB SCN SERPL QL: NEGATIVE — SIGNIFICANT CHANGE UP
BUN SERPL-MCNC: 10 MG/DL — SIGNIFICANT CHANGE UP (ref 7–23)
CALCIUM SERPL-MCNC: 9.6 MG/DL — SIGNIFICANT CHANGE UP (ref 8.4–10.5)
CHLORIDE SERPL-SCNC: 100 MMOL/L — SIGNIFICANT CHANGE UP (ref 96–108)
CO2 SERPL-SCNC: 26 MMOL/L — SIGNIFICANT CHANGE UP (ref 22–31)
CREAT SERPL-MCNC: 0.82 MG/DL — SIGNIFICANT CHANGE UP (ref 0.5–1.3)
EGFR: 96 ML/MIN/1.73M2 — SIGNIFICANT CHANGE UP
GLUCOSE SERPL-MCNC: 114 MG/DL — HIGH (ref 70–99)
HCT VFR BLD CALC: 40.8 % — SIGNIFICANT CHANGE UP (ref 39–50)
HGB BLD-MCNC: 14 G/DL — SIGNIFICANT CHANGE UP (ref 13–17)
INR BLD: 1.24 RATIO — HIGH (ref 0.88–1.16)
MCHC RBC-ENTMCNC: 32.9 PG — SIGNIFICANT CHANGE UP (ref 27–34)
MCHC RBC-ENTMCNC: 34.3 GM/DL — SIGNIFICANT CHANGE UP (ref 32–36)
MCV RBC AUTO: 96 FL — SIGNIFICANT CHANGE UP (ref 80–100)
NRBC # BLD: 0 /100 WBCS — SIGNIFICANT CHANGE UP (ref 0–0)
PLATELET # BLD AUTO: 95 K/UL — LOW (ref 150–400)
POTASSIUM SERPL-MCNC: 3.8 MMOL/L — SIGNIFICANT CHANGE UP (ref 3.5–5.3)
POTASSIUM SERPL-SCNC: 3.8 MMOL/L — SIGNIFICANT CHANGE UP (ref 3.5–5.3)
PROT SERPL-MCNC: 7.8 G/DL — SIGNIFICANT CHANGE UP (ref 6–8.3)
PROTHROM AB SERPL-ACNC: 14.4 SEC — HIGH (ref 10.5–13.4)
RBC # BLD: 4.25 M/UL — SIGNIFICANT CHANGE UP (ref 4.2–5.8)
RBC # FLD: 13.5 % — SIGNIFICANT CHANGE UP (ref 10.3–14.5)
RH IG SCN BLD-IMP: POSITIVE — SIGNIFICANT CHANGE UP
SODIUM SERPL-SCNC: 138 MMOL/L — SIGNIFICANT CHANGE UP (ref 135–145)
WBC # BLD: 4.33 K/UL — SIGNIFICANT CHANGE UP (ref 3.8–10.5)
WBC # FLD AUTO: 4.33 K/UL — SIGNIFICANT CHANGE UP (ref 3.8–10.5)

## 2023-01-11 PROCEDURE — C1760: CPT

## 2023-01-11 PROCEDURE — 75774 ARTERY X-RAY EACH VESSEL: CPT | Mod: 26,59

## 2023-01-11 PROCEDURE — 75774 ARTERY X-RAY EACH VESSEL: CPT

## 2023-01-11 PROCEDURE — 75726 ARTERY X-RAYS ABDOMEN: CPT | Mod: 26

## 2023-01-11 PROCEDURE — G1004: CPT

## 2023-01-11 PROCEDURE — 78830 RP LOCLZJ TUM SPECT W/CT 1: CPT | Mod: 26,MG

## 2023-01-11 PROCEDURE — 80053 COMPREHEN METABOLIC PANEL: CPT

## 2023-01-11 PROCEDURE — 85610 PROTHROMBIN TIME: CPT

## 2023-01-11 PROCEDURE — 86900 BLOOD TYPING SEROLOGIC ABO: CPT

## 2023-01-11 PROCEDURE — C1769: CPT

## 2023-01-11 PROCEDURE — 85027 COMPLETE CBC AUTOMATED: CPT

## 2023-01-11 PROCEDURE — 78830 RP LOCLZJ TUM SPECT W/CT 1: CPT | Mod: MG

## 2023-01-11 PROCEDURE — 85730 THROMBOPLASTIN TIME PARTIAL: CPT

## 2023-01-11 PROCEDURE — 36247 INS CATH ABD/L-EXT ART 3RD: CPT

## 2023-01-11 PROCEDURE — 86901 BLOOD TYPING SEROLOGIC RH(D): CPT

## 2023-01-11 PROCEDURE — 76937 US GUIDE VASCULAR ACCESS: CPT

## 2023-01-11 PROCEDURE — 86850 RBC ANTIBODY SCREEN: CPT

## 2023-01-11 PROCEDURE — C1894: CPT

## 2023-01-11 PROCEDURE — C1887: CPT

## 2023-01-11 PROCEDURE — 76937 US GUIDE VASCULAR ACCESS: CPT | Mod: 26

## 2023-01-11 PROCEDURE — 75726 ARTERY X-RAYS ABDOMEN: CPT

## 2023-01-11 NOTE — PRE PROCEDURE NOTE - PRE PROCEDURE EVALUATION
HPI: 67y Male with pmhx of HCV, cirrhosis, OA in right knee, lumbar disc degeneration requiring Opioids prescribed by a pain specialist and recent diagnosis of HCC who presents today for mapping angiogram for a LIRADS 5, enlarging 1.4 cm observation in segment 8/dome.      Allergies: penicillins (Other (Unknown))    Medications (Abx/Cardiac/Anticoagulation/Blood Products)      Data:  165.1  56.7  T(C): 36.5  HR: --  BP: 165/95  RR: 18  SpO2: 100%    -WBC 4.33 / HgB 14.0 / Hct 40.8 / Plt 95  -Na 138 / Cl 100 / BUN 10 / Glucose 114  -K 3.8 / CO2 26 / Cr 0.82  -ALT 11 / Alk Phos 76 / T.Bili 1.0  -INR1.24    Imaging: Reviewed    Plan:   -67y Male presents for visceral angiogram and Tc99m-MAA injection for lung shunt study.  -Risks/Benefits/alternatives explained with the patient and/or healthcare proxy and witnessed informed consent obtained.

## 2023-01-11 NOTE — ASU DISCHARGE PLAN (ADULT/PEDIATRIC) - NS MD DC FALL RISK RISK
For information on Fall & Injury Prevention, visit: [email or web address] OR  [email or web address] OR  https://www.cdc.gov/steadi/patient.html

## 2023-01-11 NOTE — ASU DISCHARGE PLAN (ADULT/PEDIATRIC) - ASU DC SPECIAL INSTRUCTIONSFT
Hepatic angiogram/mapping discharge instructions:    - There may be a small area of bruising around the groin site.                   - You may resume your normal diet.  - You may resume your normal medications as instructed.  - Do not drive, engage in heavy lifting or strenuous activity, or drink any alcoholic beverages for the next 24 hours.     If you have a problem that you believe requires IMMEDIATE attention, please go to your NEAREST Emergency Room.    Notify your primary physician and/or Interventional Radiology IMMEDIATELY if you experience any of the following       - Fever of 101F or 38C       - Chills or Rigors/ Shakes       - Worsening Pain       - Blood soaked bandages or worsening bleeding       - Lightheadedness and/or dizziness upon standing       - Chest Pain/ Tightness       - Shortness of Breath       - Difficulty walking    Follow Up Instructions:   - Please call the IR Office at [phone] with any questions about your upcoming radioembolization (Y90) treatment.

## 2023-01-11 NOTE — ASU PATIENT PROFILE, ADULT - FALL HARM RISK - UNIVERSAL INTERVENTIONS
Bed in lowest position, wheels locked, appropriate side rails in place/Call bell, personal items and telephone in reach/Instruct patient to call for assistance before getting out of bed or chair/Non-slip footwear when patient is out of bed/[place] to call system/Physically safe environment - no spills, clutter or unnecessary equipment/Purposeful Proactive Rounding/Room/bathroom lighting operational, light cord in reach

## 2023-01-11 NOTE — ASU PATIENT PROFILE, ADULT - NSICDXPASTMEDICALHX_GEN_ALL_CORE_FT
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

## 2023-01-18 ENCOUNTER — RESULT REVIEW (OUTPATIENT)
Age: 68
End: 2023-01-18

## 2023-01-18 ENCOUNTER — OUTPATIENT (OUTPATIENT)
Dept: OUTPATIENT SERVICES | Facility: HOSPITAL | Age: 68
LOS: 1 days | End: 2023-01-18
Payer: MEDICARE

## 2023-01-18 ENCOUNTER — TRANSCRIPTION ENCOUNTER (OUTPATIENT)
Age: 68
End: 2023-01-18

## 2023-01-18 VITALS
HEART RATE: 64 BPM | RESPIRATION RATE: 16 BRPM | SYSTOLIC BLOOD PRESSURE: 150 MMHG | OXYGEN SATURATION: 99 % | DIASTOLIC BLOOD PRESSURE: 98 MMHG

## 2023-01-18 VITALS
SYSTOLIC BLOOD PRESSURE: 160 MMHG | TEMPERATURE: 98 F | OXYGEN SATURATION: 100 % | HEIGHT: 65 IN | WEIGHT: 125 LBS | RESPIRATION RATE: 16 BRPM | DIASTOLIC BLOOD PRESSURE: 98 MMHG | HEART RATE: 55 BPM

## 2023-01-18 DIAGNOSIS — C22.0 LIVER CELL CARCINOMA: ICD-10-CM

## 2023-01-18 LAB
ALBUMIN SERPL ELPH-MCNC: 4.3 G/DL — SIGNIFICANT CHANGE UP (ref 3.3–5)
ALP SERPL-CCNC: 79 U/L — SIGNIFICANT CHANGE UP (ref 40–120)
ALT FLD-CCNC: 11 U/L — SIGNIFICANT CHANGE UP (ref 10–45)
ANION GAP SERPL CALC-SCNC: 11 MMOL/L — SIGNIFICANT CHANGE UP (ref 5–17)
APTT BLD: 31.9 SEC — SIGNIFICANT CHANGE UP (ref 27.5–35.5)
AST SERPL-CCNC: 31 U/L — SIGNIFICANT CHANGE UP (ref 10–40)
BILIRUB SERPL-MCNC: 0.8 MG/DL — SIGNIFICANT CHANGE UP (ref 0.2–1.2)
BLD GP AB SCN SERPL QL: NEGATIVE — SIGNIFICANT CHANGE UP
BUN SERPL-MCNC: 10 MG/DL — SIGNIFICANT CHANGE UP (ref 7–23)
CALCIUM SERPL-MCNC: 9.3 MG/DL — SIGNIFICANT CHANGE UP (ref 8.4–10.5)
CHLORIDE SERPL-SCNC: 100 MMOL/L — SIGNIFICANT CHANGE UP (ref 96–108)
CO2 SERPL-SCNC: 24 MMOL/L — SIGNIFICANT CHANGE UP (ref 22–31)
CREAT SERPL-MCNC: 0.95 MG/DL — SIGNIFICANT CHANGE UP (ref 0.5–1.3)
EGFR: 88 ML/MIN/1.73M2 — SIGNIFICANT CHANGE UP
GLUCOSE SERPL-MCNC: 121 MG/DL — HIGH (ref 70–99)
HCT VFR BLD CALC: 39 % — SIGNIFICANT CHANGE UP (ref 39–50)
HGB BLD-MCNC: 13.4 G/DL — SIGNIFICANT CHANGE UP (ref 13–17)
INR BLD: 1.2 RATIO — HIGH (ref 0.88–1.16)
MCHC RBC-ENTMCNC: 33.1 PG — SIGNIFICANT CHANGE UP (ref 27–34)
MCHC RBC-ENTMCNC: 34.4 GM/DL — SIGNIFICANT CHANGE UP (ref 32–36)
MCV RBC AUTO: 96.3 FL — SIGNIFICANT CHANGE UP (ref 80–100)
NRBC # BLD: 0 /100 WBCS — SIGNIFICANT CHANGE UP (ref 0–0)
PLATELET # BLD AUTO: 88 K/UL — LOW (ref 150–400)
POTASSIUM SERPL-MCNC: 4.2 MMOL/L — SIGNIFICANT CHANGE UP (ref 3.5–5.3)
POTASSIUM SERPL-SCNC: 4.2 MMOL/L — SIGNIFICANT CHANGE UP (ref 3.5–5.3)
PROT SERPL-MCNC: 7.6 G/DL — SIGNIFICANT CHANGE UP (ref 6–8.3)
PROTHROM AB SERPL-ACNC: 13.8 SEC — HIGH (ref 10.5–13.4)
RBC # BLD: 4.05 M/UL — LOW (ref 4.2–5.8)
RBC # FLD: 13.9 % — SIGNIFICANT CHANGE UP (ref 10.3–14.5)
RH IG SCN BLD-IMP: POSITIVE — SIGNIFICANT CHANGE UP
SODIUM SERPL-SCNC: 135 MMOL/L — SIGNIFICANT CHANGE UP (ref 135–145)
WBC # BLD: 5.25 K/UL — SIGNIFICANT CHANGE UP (ref 3.8–10.5)
WBC # FLD AUTO: 5.25 K/UL — SIGNIFICANT CHANGE UP (ref 3.8–10.5)

## 2023-01-18 PROCEDURE — 76937 US GUIDE VASCULAR ACCESS: CPT

## 2023-01-18 PROCEDURE — 36247 INS CATH ABD/L-EXT ART 3RD: CPT

## 2023-01-18 PROCEDURE — 86850 RBC ANTIBODY SCREEN: CPT

## 2023-01-18 PROCEDURE — 85027 COMPLETE CBC AUTOMATED: CPT

## 2023-01-18 PROCEDURE — 86900 BLOOD TYPING SEROLOGIC ABO: CPT

## 2023-01-18 PROCEDURE — 37243 VASC EMBOLIZE/OCCLUDE ORGAN: CPT

## 2023-01-18 PROCEDURE — C1887: CPT

## 2023-01-18 PROCEDURE — 76380 CAT SCAN FOLLOW-UP STUDY: CPT

## 2023-01-18 PROCEDURE — 85730 THROMBOPLASTIN TIME PARTIAL: CPT

## 2023-01-18 PROCEDURE — 79445 NUCLEAR RX INTRA-ARTERIAL: CPT | Mod: 26

## 2023-01-18 PROCEDURE — 86901 BLOOD TYPING SEROLOGIC RH(D): CPT

## 2023-01-18 PROCEDURE — C1769: CPT

## 2023-01-18 PROCEDURE — C1760: CPT

## 2023-01-18 PROCEDURE — 76937 US GUIDE VASCULAR ACCESS: CPT | Mod: 26

## 2023-01-18 PROCEDURE — 76380 CAT SCAN FOLLOW-UP STUDY: CPT | Mod: 26,MH,59

## 2023-01-18 PROCEDURE — C2616: CPT

## 2023-01-18 PROCEDURE — 85610 PROTHROMBIN TIME: CPT

## 2023-01-18 PROCEDURE — 80053 COMPREHEN METABOLIC PANEL: CPT

## 2023-01-18 PROCEDURE — 79445 NUCLEAR RX INTRA-ARTERIAL: CPT

## 2023-01-18 PROCEDURE — C1894: CPT

## 2023-01-18 RX ORDER — FENTANYL CITRATE 50 UG/ML
25 INJECTION INTRAVENOUS
Refills: 0 | Status: DISCONTINUED | OUTPATIENT
Start: 2023-01-18 | End: 2023-01-18

## 2023-01-18 RX ORDER — FENTANYL CITRATE 50 UG/ML
50 INJECTION INTRAVENOUS
Refills: 0 | Status: DISCONTINUED | OUTPATIENT
Start: 2023-01-18 | End: 2023-01-18

## 2023-01-18 RX ORDER — ONDANSETRON 8 MG/1
8 TABLET, FILM COATED ORAL ONCE
Refills: 0 | Status: DISCONTINUED | OUTPATIENT
Start: 2023-01-18 | End: 2023-02-01

## 2023-01-18 RX ORDER — SODIUM CHLORIDE 9 MG/ML
1000 INJECTION, SOLUTION INTRAVENOUS
Refills: 0 | Status: DISCONTINUED | OUTPATIENT
Start: 2023-01-18 | End: 2023-02-01

## 2023-01-18 NOTE — ASU DISCHARGE PLAN (ADULT/PEDIATRIC) - NURSING INSTRUCTIONS
Please feel free to contact us at [phone] if any problems arise. After 6PM, Monday through Friday, on weekends and on holidays, please call [phone] and ask for the radiology resident on call to be paged.

## 2023-01-18 NOTE — PROGRESS NOTE ADULT - SUBJECTIVE AND OBJECTIVE BOX
Interventional Radiology Brief- Operative Note    Procedure:S8 y90 tumor embo    Operators: Vinay Fung    Anesthesia (type): MAC    Contrast: 32cc    EBL: minimal    Findings/Follow up Plan of Care: S8 y90 embolization performed.    Specimens Removed: none    Implants: y90    Complications: none    Condition/Disposition: stable, to RR    Please call Interventional Radiology x [phone] with any questions, concerns, or issues.

## 2023-01-18 NOTE — PROGRESS NOTE ADULT - SUBJECTIVE AND OBJECTIVE BOX
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1 pg  Mean Cell Hemoglobin Concentration : 34.4 gm/dL      01-18    135  |  100  |  10  ----------------------------<  121<H>  4.2   |  24  |  0.95    Ca    9.3      18 Jan 2023 08:42    TPro  7.6  /  Alb  4.3  /  TBili  0.8  /  DBili  x   /  AST  31  /  ALT  11  /  AlkPhos  79  01-18    PT/INR - ( 18 Jan 2023 08:42 )   PT: 13.8 sec;   INR: 1.20 ratio       PTT - ( 18 Jan 2023 08:42 )  PTT:31.9 sec    Procedure/ risks/ benefits/ alternatives were explained, informed consent obtained from patient, verbalizes understanding.     Plan: y90 radioembolization S8.

## 2023-01-18 NOTE — ASU PREOP CHECKLIST - TEMPERATURE IN FAHRENHEIT (DEGREES F)
"Chief Complaint   Patient presents with     Eye Problem     poss. pink eye       Initial Pulse 122  Temp 98  F (36.7  C) (Tympanic)  Resp 30  Ht 2' 8.5\" (0.826 m)  Wt 24 lb 10.2 oz (11.2 kg)  HC 18.5\" (47 cm)  SpO2 100%  BMI 16.4 kg/m2 Estimated body mass index is 16.4 kg/(m^2) as calculated from the following:    Height as of this encounter: 2' 8.5\" (0.826 m).    Weight as of this encounter: 24 lb 10.2 oz (11.2 kg).  Medication Reconciliation: complete    Inessa Faye MA  "
97.7

## 2023-01-18 NOTE — ASU DISCHARGE PLAN (ADULT/PEDIATRIC) - NS MD DC FALL RISK RISK
For information on Fall & Injury Prevention, visit: [email or web address] OR  [email or web address] OR  https://www.cdc.gov/steadi/patient.html

## 2023-01-19 ENCOUNTER — APPOINTMENT (OUTPATIENT)
Dept: INTERVENTIONAL RADIOLOGY/VASCULAR | Facility: CLINIC | Age: 68
End: 2023-01-19

## 2023-01-20 DIAGNOSIS — C22.0 LIVER CELL CARCINOMA: ICD-10-CM

## 2023-01-25 DIAGNOSIS — C22.0 LIVER CELL CARCINOMA: ICD-10-CM

## 2023-01-27 ENCOUNTER — NON-APPOINTMENT (OUTPATIENT)
Age: 68
End: 2023-01-27

## 2023-02-02 ENCOUNTER — LABORATORY RESULT (OUTPATIENT)
Age: 68
End: 2023-02-02

## 2023-02-03 LAB
ALBUMIN SERPL ELPH-MCNC: 4.2 G/DL
ALP BLD-CCNC: 98 U/L
ALT SERPL-CCNC: 14 U/L
ANION GAP SERPL CALC-SCNC: 12 MMOL/L
AST SERPL-CCNC: 32 U/L
BASOPHILS # BLD AUTO: 0.01 K/UL
BASOPHILS NFR BLD AUTO: 0.3 %
BILIRUB SERPL-MCNC: 1.1 MG/DL
BUN SERPL-MCNC: 9 MG/DL
CALCIUM SERPL-MCNC: 8.9 MG/DL
CHLORIDE SERPL-SCNC: 99 MMOL/L
CO2 SERPL-SCNC: 25 MMOL/L
CREAT SERPL-MCNC: 0.79 MG/DL
EGFR: 97 ML/MIN/1.73M2
EOSINOPHIL # BLD AUTO: 0.13 K/UL
EOSINOPHIL NFR BLD AUTO: 3.6 %
GLUCOSE SERPL-MCNC: 112 MG/DL
HCT VFR BLD CALC: 37.9 %
HGB BLD-MCNC: 12.9 G/DL
IMM GRANULOCYTES NFR BLD AUTO: 0.3 %
LYMPHOCYTES # BLD AUTO: 0.23 K/UL
LYMPHOCYTES NFR BLD AUTO: 6.4 %
MAN DIFF?: NORMAL
MCHC RBC-ENTMCNC: 32.7 PG
MCHC RBC-ENTMCNC: 34 GM/DL
MCV RBC AUTO: 96.2 FL
MONOCYTES # BLD AUTO: 0.45 K/UL
MONOCYTES NFR BLD AUTO: 12.5 %
NEUTROPHILS # BLD AUTO: 2.76 K/UL
NEUTROPHILS NFR BLD AUTO: 76.9 %
PLATELET # BLD AUTO: 107 K/UL
POTASSIUM SERPL-SCNC: 4.2 MMOL/L
PROT SERPL-MCNC: 7 G/DL
RBC # BLD: 3.94 M/UL
RBC # FLD: 13.7 %
SODIUM SERPL-SCNC: 136 MMOL/L
WBC # FLD AUTO: 3.59 K/UL

## 2023-02-17 ENCOUNTER — APPOINTMENT (OUTPATIENT)
Dept: INTERVENTIONAL RADIOLOGY/VASCULAR | Facility: CLINIC | Age: 68
End: 2023-02-17
Payer: MEDICARE

## 2023-02-17 PROCEDURE — 99442: CPT | Mod: 95

## 2023-02-17 NOTE — PHYSICAL EXAM
[Alert] : alert [No Acute Distress] : no acute distress [Normal Voice/Communication] : normal voice communication [No Accessory Muscle Use] : no accessory muscle use [Oriented x3] : oriented to person, place, and time [Normal Insight/Judgement] : insight and judgment were intact [Normal Affect] : the affect was normal [Normal Mood] : the mood was normal [Recent Memory Normal] : recent memory was not impaired [Remote Memory Normal] : remote memory was not impaired [Restricted in physically strenuous activity but ambulatory and able to carry out work of a light or sedentary nature] : Restricted in physically strenuous activity but ambulatory and able to carry out work of a light or sedentary nature, e.g., light house work, office work

## 2023-02-17 NOTE — ADDENDUM
[FreeTextEntry1] : I, Dr. Fung, personally performed the evaluation and management (E/M) services for this established patient who presents today with (a) new problem(s)/exacerbation of (an) existing condition(s).  That E/M includes conducting the examination, assessing all new/exacerbated conditions, and establishing a new plan of care.  Today, my ACP, [Nahed Perez NP], was here to observe my evaluation and management services for this new problem/exacerbated condition to be followed going forward.\par \par \par

## 2023-02-17 NOTE — HISTORY OF PRESENT ILLNESS
[FreeTextEntry1] : This is a a 68 year old male with pmhx of HCV, cirrhosis, OA in right knee, lumbar disc degeneration requiring Opioids prescribed by a pain specialist and recent diagnosis of HCC who presents today for follow up for liver directed therapy. \par \par Pt was diagnosed with cirrhosis/HCV during hospitalization in 2014 for hemoptysis from esophageal varices. He was recently referred to Dr. Irving by Dr. Marcus Elias for newly dx HCC. He has a history of cirrhosis and hepatitis C. Patient just completed his course of Harvoni, his most recent PCR (11/8/21) not detected. \par \par He was noted to have liver lesion on screening imaging, referred to IR by Dr. Irving to discuss liver directed therapy in 12/2021.  He is now s/p Y90 to segment 5 on 3/3/22 and Y90 to segment 8 on 1/18/23.  He returns today for follow up.  \par \par Post procedure, had some nausea and fatigue, but resolved within 1-2 weeks post procedure. \par \par Reports to be feeling well, but has persistent fatigue.  Fatigue level has been consistent over the last few years, since diagnosed with cirrhosis.  \par pattie Was working with transplant team, having some issues with staff regarding timely completion of workup.  Recently received a letter stating that he was discharged from transplant practice. Has been following with GI, Dr. Elias. \par \par He has decompensated disease in the form of ascites. He required 1 paracentesis (2L) about 2014, the same year he reports EV bleed at [facility].. Reports this was his last hospitalization. \par \par Hep: Maria Fernanda\par GI: Marcus Elias [phone] \par

## 2023-02-17 NOTE — ASSESSMENT
[SIRT Procedure & Planning] : SIRT procedure and planning discussed at length [Other: _____] : [unfilled] [FreeTextEntry1] : This is a a 68 year old male with pmhx of HCV, cirrhosis, OA in right knee, lumbar disc degeneration requiring Opioids prescribed by a pain specialist and recent diagnosis of HCC who presents today for follow up for liver directed therapy. \par \par 1. HCC\par - now s/p Y90 to segment 5 on 3/3/22 and Y90 to segment 8 on 1/18/23\par - post procedure course uncomplicated and as expected\par - reviewed f/u labs, at baseline\par - f/u imaging at 2-3 month price\par - f/u telehealth after imaging\par - continue to follow with GI, prefers to f/u with Dr. Elias instead of hepatology\par \par 2.  Elevated AFP\par - 12/2021 215, Y90 in 3/2022, 24.6 5/2022\par - will repeat prior to next MRI\par \par I have provided the patient the opportunity to ask questions and have answered them to their satisfaction. They are encouraged to contact our office with any further questions, concerns, or issues.\par \par  \par \par \par

## 2023-02-17 NOTE — REASON FOR VISIT
[Follow-Up: _____] : a [unfilled] follow-up visit [Home] : at home, [unfilled] , at the time of the visit. [Medical Office: ([facility])___] : at the medical office located in  [Spouse] : spouse [Verbal consent obtained from patient] : the patient, [unfilled]

## 2023-03-23 NOTE — ASU PATIENT PROFILE, ADULT - ABILITY TO HEAR (WITH HEARING AID OR HEARING APPLIANCE IF NORMALLY USED):
Adequate: hears normal conversation without difficulty Advancement-Rotation Flap Text: The defect edges were debeveled with a #15 scalpel blade.  Given the location of the defect, shape of the defect and the proximity to free margins an advancement-rotation flap was deemed most appropriate.  Using a sterile surgical marker, an appropriate flap was drawn incorporating the defect and placing the expected incisions within the relaxed skin tension lines where possible. The area thus outlined was incised deep to adipose tissue with a #15 scalpel blade.  The skin margins were undermined to an appropriate distance in all directions utilizing iris scissors.

## 2023-03-28 ENCOUNTER — OUTPATIENT (OUTPATIENT)
Dept: OUTPATIENT SERVICES | Facility: HOSPITAL | Age: 68
LOS: 1 days | End: 2023-03-28
Payer: MEDICARE

## 2023-03-28 ENCOUNTER — APPOINTMENT (OUTPATIENT)
Dept: MRI IMAGING | Facility: CLINIC | Age: 68
End: 2023-03-28
Payer: MEDICARE

## 2023-03-28 DIAGNOSIS — C22.0 LIVER CELL CARCINOMA: ICD-10-CM

## 2023-03-28 PROCEDURE — A9585: CPT

## 2023-03-28 PROCEDURE — 74183 MRI ABD W/O CNTR FLWD CNTR: CPT | Mod: 26,MH

## 2023-03-28 PROCEDURE — 74183 MRI ABD W/O CNTR FLWD CNTR: CPT

## 2023-04-03 ENCOUNTER — NON-APPOINTMENT (OUTPATIENT)
Age: 68
End: 2023-04-03

## 2023-04-06 ENCOUNTER — APPOINTMENT (OUTPATIENT)
Dept: INTERVENTIONAL RADIOLOGY/VASCULAR | Facility: CLINIC | Age: 68
End: 2023-04-06
Payer: MEDICARE

## 2023-04-06 DIAGNOSIS — R91.8 OTHER NONSPECIFIC ABNORMAL FINDING OF LUNG FIELD: ICD-10-CM

## 2023-04-06 PROCEDURE — 99442: CPT | Mod: 95

## 2023-04-06 NOTE — REASON FOR VISIT
[Follow-Up: _____] : a [unfilled] follow-up visit [Spouse] : spouse [Home] : at home, [unfilled] , at the time of the visit. [Medical Office: ([facility])___] : at the medical office located in  [Verbal consent obtained from patient] : the patient, [unfilled]

## 2023-04-10 NOTE — HISTORY OF PRESENT ILLNESS
[FreeTextEntry1] : This is a a 68 year old male with pmhx of HCV, cirrhosis, OA in right knee, lumbar disc degeneration requiring Opioids prescribed by a pain specialist and recent diagnosis of HCC who presents today for follow up for liver directed therapy. \par \par Pt was diagnosed with cirrhosis/HCV during hospitalization in 2014 for hemoptysis from esophageal varices. He was recently referred to Dr. Irving by Dr. Marcus Elias for newly dx HCC. He has a history of cirrhosis and hepatitis C. Patient just completed his course of Harvoni, his most recent PCR (11/8/21) not detected. \par \par He was noted to have liver lesion on screening imaging, referred to IR by Dr. Irving to discuss liver directed therapy in 12/2021.  He is now s/p Y90 to segment 5 on 3/3/22 and Y90 to segment 8 on 1/18/23.  He returns today for follow up.  \par \par Reports to be feeling well, but has persistent fatigue.  Fatigue level has been consistent over the last few years, since diagnosed with cirrhosis.  \par pattie Was working with transplant team, having some issues with staff regarding timely completion of workup.  Recently received a letter stating that he was discharged from transplant practice. He is not interested in pursing transplant.  Has been following with GI, Dr. Elias, in past, has not seen him in at least 1 year. \par \par He has decompensated disease in the form of ascites. He required 1 paracentesis (2L) about 2014, the same year he reports EV bleed at [facility]. Reports this was his last hospitalization. \par \par Hep: Maria Fernanda\par GI: Marcus Elias [phone]\par

## 2023-04-10 NOTE — DATA REVIEWED
[FreeTextEntry1] : \par \par \par \par \par EXAM: [id number] - MR ABDOMEN WAW IC - ORDERED BY: YASMANY LAZCANO\par \par \par PROCEDURE DATE: 03/28/2023\par \par \par \par INTERPRETATION: CLINICAL INFORMATION: Cirrhosis. HCC, status post radioembolization to segment 5 on 3/3/2022 and segment 8 on 1/18/2023.\par \par COMPARISON: Abdominal MR 11/7/2022, 8/6/2022. Chest CT 11/16/2022.\par \par CONTRAST/COMPLICATIONS:\par IV Contrast: Gadavist 5.5 cc administered 2.0 cc discarded\par Oral Contrast: NONE\par Complications: None reported at time of study completion\par \par PROCEDURE:\par MRI of the abdomen was performed.\par MRCP was performed.\par \par FINDINGS:\par LOWER CHEST: Left lower lobe atelectasis. Partially imaged patchy opacities more superiorly are suspicious for pneumonia.\par \par LIVER: Cirrhosis. Postradioembolization changes in the right hepatic lobe, including new areas of parenchymal necrosis within segment 8. Treated lesions as follows:\par \par Lesion #: 1\par Location: Segment 5\par Size: Posttreatment cavity (20:36) measures 1.9 x 1.6 cm, previously 3.3 x 3.2 cm at 8/6/2022.\par AP Hyperenhancement: YES; thin peripheral rim and peripheral treatment specific enhancement\par LI-RADS v 2018 Category: LR- TR nonviable.\par \par Lesion #: 2\par Location: Segment 8/dome\par Size: Posttreatment cavity (20:19) measures 1.7 x 1.1 cm (lesion previously measured 1.4 x 0.9 cm).\par AP Hyperenhancement: NO\par LI-RADS v 2018 Category: LR- TR nonviable.\par \par BILE DUCTS: Normal caliber.\par GALLBLADDER: Within normal limits.\par SPLEEN: Splenomegaly, unchanged.\par PANCREAS: Within normal limits.\par ADRENALS: Within normal limits.\par KIDNEYS/URETERS: Bilateral renal cysts. No hydronephrosis.\par \par VISUALIZED PORTIONS:\par BOWEL: Within normal limits.\par PERITONEUM: Small ascites.\par VESSELS: Patent hepatic and portal veins. Paraesophageal varices. Recanalized paraumbilical vein and anterior abdominal wall collaterals.\par RETROPERITONEUM/LYMPH NODES: No lymphadenopathy.\par ABDOMINAL WALL: Within normal limits.\par BONES: Mild levoscoliosis of lumbar spine with multilevel degenerative changes.\par \par IMPRESSION:\par 1. Cirrhosis with portal hypertension.\par 2. Radioembolization change within the right hepatic lobe. Two LR-TR nonviable lesions within the right hepatic lobe. No new liver lesion.\par 3. Left lower lobe atelectasis, as well as patchy opacities that are suspicious for pneumonia.\par \par \par \par --- End of Report ---\par

## 2023-04-10 NOTE — ASSESSMENT
[SIRT Procedure & Planning] : SIRT procedure and planning discussed at length [Other: _____] : [unfilled] [FreeTextEntry1] : This is a a 68 year old male with pmhx of HCV, cirrhosis, OA in right knee, lumbar disc degeneration requiring Opioids prescribed by a pain specialist and recent diagnosis of HCC who presents today for follow up for liver directed therapy. \par \par 1. HCC\par - now s/p Y90 to segment 5 on 3/3/22 and Y90 to segment 8 on 1/18/23\par - 3/28/23 MR demonstrates  Cirrhosis with portal hypertension, radioembolization change within the right hepatic lobe. Two LR-TR nonviable lesions within the right hepatic lobe. No new liver lesion.\par - I have reviewed their imaging and discussed the findings with Mr. THOMASON.  \par - The imaging does not show any evidence of viable tumor so I am recommending repeat imaging in 3 months. \par - f/u MRI 3 months\par - f/u telehealth after imaging  to review the findings.\par - While there is no indication for intervention at this time, I reinforced the importance of continued surveillance with serial imaging.\par - continue to follow with GI, prefers to f/u with Dr. Elias instead of hepatology\par \par 2.  Elevated AFP\par - 12/2021 215, Y90 in 3/2022, 24.6 5/2022\par - has not had labs done recently, offered to order labs, but pt prefers to f/u with Dr. Elias for labwork\par \par 3.  Left lower lobe opacity\par - recent MR with left lower lobe atelectasis, as well as patchy opacities\par - had contacted pt earlier in the week, discussed how findings are suspicious for pneumonia\par - reports URI 2 weeks ago. Had cough, congestion that last about 1.5 weeks. He thought he had COVID, but tested negative. Has been feeling better over the last few days. Afebrile, denies chills, hemoptysis. \par - pt does not follow with PMD, does not have pulmonologist, so he was referred to [facility]. \par - appt arranged to see Dr. Abel Crews on 4/28/2023.\par \par I have provided the patient the opportunity to ask questions and have answered them to their satisfaction. They are encouraged to contact our office with any further questions, concerns, or issues.\par \par  \par \par \par

## 2023-04-19 ENCOUNTER — APPOINTMENT (OUTPATIENT)
Dept: GASTROENTEROLOGY | Facility: CLINIC | Age: 68
End: 2023-04-19
Payer: MEDICARE

## 2023-04-19 VITALS
HEART RATE: 63 BPM | SYSTOLIC BLOOD PRESSURE: 167 MMHG | BODY MASS INDEX: 20.55 KG/M2 | HEIGHT: 63 IN | DIASTOLIC BLOOD PRESSURE: 91 MMHG | WEIGHT: 116 LBS

## 2023-04-19 PROCEDURE — 99214 OFFICE O/P EST MOD 30 MIN: CPT

## 2023-04-19 NOTE — PHYSICAL EXAM
[Alert] : alert [Normal Voice/Communication] : normal voice/communication [Healthy Appearing] : healthy appearing [No Acute Distress] : no acute distress [Sclera] : the sclera and conjunctiva were normal [Hearing Threshold Finger Rub Not [place]] : hearing was normal [Normal Lips/Gums] : the lips and gums were normal [Oropharynx] : the oropharynx was normal [Normal Appearance] : the appearance of the neck was normal [No Neck Mass] : no neck mass was observed [No Respiratory Distress] : no respiratory distress [No Acc Muscle Use] : no accessory muscle use [Respiration, Rhythm And Depth] : normal respiratory rhythm and effort [Auscultation Breath Sounds / Voice Sounds] : lungs were clear to auscultation bilaterally [Heart Rate And Rhythm] : heart rate was normal and rhythm regular [Normal S1, S2] : normal S1 and S2 [Murmurs] : no murmurs [Bowel Sounds] : normal bowel sounds [Abdomen Tenderness] : non-tender [No Masses] : no abdominal mass palpated [Abdomen Soft] : soft [] : no hepatosplenomegaly [Oriented To Time, Place, And Person] : oriented to person, place, and time

## 2023-04-19 NOTE — ASSESSMENT
[FreeTextEntry1] : 69 yo male with history of Kenneth C and cirrhosis.  Patient had a recent PCR that was negative.  Plan to obtain stool specimens for possible infectious gastroenteritis.  Plan to refer patient to ENT for evaluation.  We will also arrange upper endoscopy with possible band ligation for history of esophageal varices.

## 2023-04-19 NOTE — HISTORY OF PRESENT ILLNESS
[FreeTextEntry1] : Mr. JOSE R THOMASON is a 68 year old male with history of otitis C and cirrhosis.  Patient had recent ablation of hepatocellular carcinoma twice.  Currently patient feels well with the exception of an acute onset of gastroenteritis with diarrhea.  There has been no fevers or chills.  Patient also has a number of sinus complaints.  Last endoscopy was several years ago.  Patient has a history of variceal bleeding in the remote past.\par

## 2023-04-28 ENCOUNTER — APPOINTMENT (OUTPATIENT)
Dept: INTERNAL MEDICINE | Facility: CLINIC | Age: 68
End: 2023-04-28

## 2023-05-16 ENCOUNTER — LABORATORY RESULT (OUTPATIENT)
Age: 68
End: 2023-05-16

## 2023-05-17 ENCOUNTER — APPOINTMENT (OUTPATIENT)
Dept: GASTROENTEROLOGY | Facility: CLINIC | Age: 68
End: 2023-05-17

## 2023-05-18 ENCOUNTER — OUTPATIENT (OUTPATIENT)
Dept: OUTPATIENT SERVICES | Facility: HOSPITAL | Age: 68
LOS: 1 days | Discharge: HOME | End: 2023-05-18

## 2023-05-18 ENCOUNTER — APPOINTMENT (OUTPATIENT)
Dept: GASTROENTEROLOGY | Facility: HOSPITAL | Age: 68
End: 2023-05-18
Payer: MEDICARE

## 2023-05-18 VITALS
HEIGHT: 65 IN | RESPIRATION RATE: 16 BRPM | SYSTOLIC BLOOD PRESSURE: 172 MMHG | OXYGEN SATURATION: 100 % | WEIGHT: 119.93 LBS | TEMPERATURE: 98 F | HEART RATE: 64 BPM | DIASTOLIC BLOOD PRESSURE: 98 MMHG

## 2023-05-18 DIAGNOSIS — K74.60 UNSPECIFIED CIRRHOSIS OF LIVER: ICD-10-CM

## 2023-05-18 PROCEDURE — 43235 EGD DIAGNOSTIC BRUSH WASH: CPT | Mod: GC

## 2023-05-18 RX ORDER — OXYCODONE HYDROCHLORIDE 5 MG/1
1 TABLET ORAL
Qty: 0 | Refills: 0 | DISCHARGE

## 2023-05-18 RX ORDER — PANTOPRAZOLE SODIUM 20 MG/1
1 TABLET, DELAYED RELEASE ORAL
Qty: 0 | Refills: 0 | DISCHARGE

## 2023-05-18 RX ORDER — PROPRANOLOL HCL 160 MG
1 CAPSULE, EXTENDED RELEASE 24HR ORAL
Qty: 0 | Refills: 0 | DISCHARGE

## 2023-05-23 DIAGNOSIS — I85.10 SECONDARY ESOPHAGEAL VARICES WITHOUT BLEEDING: ICD-10-CM

## 2023-05-23 DIAGNOSIS — Z88.0 ALLERGY STATUS TO PENICILLIN: ICD-10-CM

## 2023-05-23 DIAGNOSIS — K76.6 PORTAL HYPERTENSION: ICD-10-CM

## 2023-05-23 DIAGNOSIS — Z91.013 ALLERGY TO SEAFOOD: ICD-10-CM

## 2023-05-23 DIAGNOSIS — K74.60 UNSPECIFIED CIRRHOSIS OF LIVER: ICD-10-CM

## 2023-05-23 DIAGNOSIS — K31.89 OTHER DISEASES OF STOMACH AND DUODENUM: ICD-10-CM

## 2023-05-24 ENCOUNTER — APPOINTMENT (OUTPATIENT)
Dept: INTERNAL MEDICINE | Facility: CLINIC | Age: 68
End: 2023-05-24
Payer: MEDICARE

## 2023-05-24 VITALS
TEMPERATURE: 97.9 F | WEIGHT: 124.9 LBS | DIASTOLIC BLOOD PRESSURE: 72 MMHG | HEIGHT: 63 IN | HEART RATE: 52 BPM | BODY MASS INDEX: 22.13 KG/M2 | SYSTOLIC BLOOD PRESSURE: 142 MMHG | OXYGEN SATURATION: 99 %

## 2023-05-24 DIAGNOSIS — R91.8 OTHER NONSPECIFIC ABNORMAL FINDING OF LUNG FIELD: ICD-10-CM

## 2023-05-24 DIAGNOSIS — F12.90 CANNABIS USE, UNSPECIFIED, UNCOMPLICATED: ICD-10-CM

## 2023-05-24 PROCEDURE — 99204 OFFICE O/P NEW MOD 45 MIN: CPT

## 2023-05-24 RX ORDER — PANTOPRAZOLE 40 MG/1
40 TABLET, DELAYED RELEASE ORAL DAILY
Qty: 35 | Refills: 0 | Status: DISCONTINUED | COMMUNITY
Start: 2022-11-11 | End: 2023-05-24

## 2023-05-24 NOTE — DISCUSSION/SUMMARY
[FreeTextEntry1] : Mr. Brothers is a 68-year-old male who presents for initial pulmonary evaluation.  He has a history of hepatocellular carcinoma, cirrhosis and hepatitis C.  Recent MRI of the abdomen demonstrated left lower lobe atelectasis with patchy infiltrates in the superior part of the left lower lobe.  Mr. Farmer is asymptomatic.  He has no fevers, chills or cough.  In view of his history of hepatocellular carcinoma the patient will have a CT scan of the abdomen without contrast.  Patient did have a CT scan of the chest in 11/22 which showed no parenchymal lung disease.  He will follow-up as scheduled.

## 2023-05-24 NOTE — HISTORY OF PRESENT ILLNESS
[TextBox_4] : Mr. Farmer is a 60-year-old male who presents for initial pulmonary evaluation.  He has a history of hepatocellular carcinoma.  He is status post radio frequency embolization to the right hepatic lobe.  The patient also has a history of cirrhosis and hepatitis C.  His hepatitis C was cured with treatment 1 year ago.  Mr. Farmer recently had an MRI of the abdomen which demonstrated some left lower lobe atelectasis and patchy opacities in the superior left lower lobe which were suspicious for pneumonia.  The patient states that prior to having that MRI did have symptoms of a cold with nonproductive cough and some pleuritic chest pain.  He had no associated fevers or chills.  The patient denies any shortness of breath.  He does not smoke cigarettes but does smoke marijuana.

## 2023-06-01 NOTE — H&P PST ADULT - PROBLEM SELECTOR PLAN 2
Received incoming fax from [facility] requesting that [address] send over current signs and symptoms indicating need for MRI of the head , any prior diagnostic studies with results, prior management, and medication lists    Form shown to [address], advised to fax over the requested information    Information faxed over as requested   Conformation received continue with meds

## 2023-06-27 NOTE — H&P PST ADULT - TEMPERATURE IN FAHRENHEIT (DEGREES F)
MEDICATIONS  (STANDING):  bisacodyl Suppository 10 milliGRAM(s) Rectal at bedtime  dextrose 5% + sodium chloride 0.45%. 1000 milliLiter(s) (125 mL/Hr) IV Continuous <Continuous>  heparin   Injectable 5000 Unit(s) SubCutaneous once  iron sucrose Injectable 200 milliGRAM(s) IV Push every 24 hours  pantoprazole  Injectable 40 milliGRAM(s) IV Push daily  polyethylene glycol/electrolyte Solution. 4000 milliLiter(s) Oral once  sertraline 50 milliGRAM(s) Oral at bedtime  tamsulosin 0.4 milliGRAM(s) Oral at bedtime    MEDICATIONS  (PRN):   98.1

## 2023-07-17 ENCOUNTER — OUTPATIENT (OUTPATIENT)
Dept: OUTPATIENT SERVICES | Facility: HOSPITAL | Age: 68
LOS: 1 days | End: 2023-07-17
Payer: MEDICARE

## 2023-07-17 ENCOUNTER — APPOINTMENT (OUTPATIENT)
Dept: MRI IMAGING | Facility: CLINIC | Age: 68
End: 2023-07-17
Payer: MEDICAID

## 2023-07-17 DIAGNOSIS — R91.8 OTHER NONSPECIFIC ABNORMAL FINDING OF LUNG FIELD: ICD-10-CM

## 2023-07-17 PROCEDURE — A9585: CPT

## 2023-07-17 PROCEDURE — 74183 MRI ABD W/O CNTR FLWD CNTR: CPT | Mod: 26,MH

## 2023-07-17 PROCEDURE — 74183 MRI ABD W/O CNTR FLWD CNTR: CPT

## 2023-07-25 PROBLEM — K74.60 ESOPHAGEAL VARICES IN CIRRHOSIS: Status: ACTIVE | Noted: 2021-12-23

## 2023-07-25 PROBLEM — J98.11 ATELECTASIS, LEFT: Status: RESOLVED | Noted: 2023-05-24 | Resolved: 2023-07-25

## 2023-07-26 ENCOUNTER — APPOINTMENT (OUTPATIENT)
Dept: INTERVENTIONAL RADIOLOGY/VASCULAR | Facility: CLINIC | Age: 68
End: 2023-07-26
Payer: MEDICARE

## 2023-07-26 DIAGNOSIS — K76.6 PORTAL HYPERTENSION: ICD-10-CM

## 2023-07-26 DIAGNOSIS — K74.60 UNSPECIFIED CIRRHOSIS OF LIVER: ICD-10-CM

## 2023-07-26 DIAGNOSIS — I85.10 UNSPECIFIED CIRRHOSIS OF LIVER: ICD-10-CM

## 2023-07-26 DIAGNOSIS — J98.11 ATELECTASIS: ICD-10-CM

## 2023-07-26 PROCEDURE — 99214 OFFICE O/P EST MOD 30 MIN: CPT | Mod: 95

## 2023-07-26 NOTE — PHYSICAL EXAM
[Alert] : alert [Normal Voice/Communication] : normal voice communication [Oriented x3] : oriented to person, place, and time [Normal Insight/Judgement] : insight and judgment were intact [Normal Mood] : the mood was normal [Recent Memory Normal] : recent memory was not impaired [Remote Memory Normal] : remote memory was not impaired [Restricted in physically strenuous activity but ambulatory and able to carry out work of a light or sedentary nature] : Restricted in physically strenuous activity but ambulatory and able to carry out work of a light or sedentary nature, e.g., light house work, office work [No Acute Distress] : no acute distress [Well Nourished] : well nourished [Well Developed] : well developed [Healthy Appearance] : healthy appearance [No Respiratory Distress] : no respiratory distress [Normal Affect] : the affect was normal

## 2023-07-26 NOTE — ASSESSMENT
[Other: _____] : [unfilled] [FreeTextEntry1] : This is a a 68 year old male with pmhx of HCV, cirrhosis, OA in right knee, lumbar disc degeneration requiring Opioids prescribed by a pain specialist and recent diagnosis of HCC who presents today for follow up for liver directed therapy. \par \par # HCC\par - now s/p Y90 to segment 5 on 3/3/22 and Y90 to segment 8 on 1/18/23\par - 7/17/23 MR demonstrated:\par  > Cirrhosis with portal hypertension.\par  > Posttreatment change of the right hepatic lobe with two nonviable lesions; LR-TR Nonviable. No new liver lesion.\par - Dr. Marte has reviewed the imaging and we discussed the findings with Mr. THOMASON  \par - the imaging does not show any evidence of viable tumor so I am recommending repeat imaging in 3 months \par - f/u MRI with IR telehealth to review results in 3 months - 10/2023\par - while there is no indication for intervention at this time, I reinforced the importance of continued surveillance with serial imaging\par - continue with consistent follow ups with GI - pt prefers to f/u with Dr. Elias instead of hepatology\par \par # Elevated AFP\par - 12/2021 215, Y90 in 3/2022, 24.6 5/2022\par - has not had labs done recently, offered to order labs, but pt prefers to f/u with Dr. Elias for labwork\par \par # Left Lower Lobe lung Opacity\par - 7/17/23 MR demonstrated lower chest within normal limits\par \par \par Mr. THOMASON's comprehension was confirmed & all questions were asked & answered to his satisfaction.\par IR contact information was reviewed with the pt should there be any questions, issues, or concerns, to be addressed, in the interim.\par \par  \par \par \par

## 2023-07-26 NOTE — HISTORY OF PRESENT ILLNESS
[0] : ~His/Her~ pain was 0 out of 10 [Home] : at home, [unfilled] , at the time of the visit. [Medical Office: ([facility])___] : at the medical office located in  [Verbal consent obtained from patient] : the patient, [unfilled] [FreeTextEntry1] : This is a a 68 year old male with pmhx of HCV, cirrhosis, OA in right knee, lumbar disc degeneration requiring Opioids prescribed by a pain specialist and recent diagnosis of HCC who presents to IR today for follow up for liver directed therapy. \par \par Mr. THOMASON reports feeling well. He denies abdominal pain, scleral or dermal discoloration, abdominal distention, ascites, LE edema, altered mentation, hematemesis, hemoptysis, hematochezia, melena,  n/v/d, unintentional weight loss or gain or any other liver related sequelae.\par \par Hepatic Hx:\par Pt was diagnosed with cirrhosis/HCV during hospitalization in 2014 for hemoptysis from esophageal varices. He was recently referred to Dr. Irving by Dr. Marcus Elias for newly dx HCC. He has a history of cirrhosis and hepatitis C. Patient just completed his course of Harvoni, his most recent PCR (11/8/21) not detected. \par \par He was noted to have liver lesion on screening imaging, referred to IR by Dr. Irving to discuss liver directed therapy in 12/2021.  He is now s/p Y90 to segment 5 on 3/3/22 and Y90 to segment 8 on 1/18/23.  He returns today for follow up.  \par \par Reports to be feeling well, but has persistent fatigue.  Fatigue level has been consistent over the last few years, since diagnosed with cirrhosis.  \par \fritz Was working with transplant team, having some issues with staff regarding timely completion of workup.  Recently received a letter stating that he was discharged from transplant practice. He is not interested in pursing transplant.  Has been following with GI, Dr. Elias, in past, has not seen him in at least 1 year. \par \par He has decompensated disease in the form of ascites. He required 1 paracentesis (2L) about 2014, the same year he reports EV bleed at [facility]. Reports this was his last hospitalization. \par \par Hep: Maria Fernanda\par GI: Marcus Elias [phone]\par

## 2023-09-20 ENCOUNTER — APPOINTMENT (OUTPATIENT)
Dept: GASTROENTEROLOGY | Facility: CLINIC | Age: 68
End: 2023-09-20
Payer: MEDICARE

## 2023-09-20 VITALS
SYSTOLIC BLOOD PRESSURE: 120 MMHG | DIASTOLIC BLOOD PRESSURE: 76 MMHG | WEIGHT: 124 LBS | BODY MASS INDEX: 21.97 KG/M2 | HEIGHT: 63 IN

## 2023-09-20 DIAGNOSIS — Z09 ENCOUNTER FOR FOLLOW-UP EXAMINATION AFTER COMPLETED TREATMENT FOR CONDITIONS OTHER THAN MALIGNANT NEOPLASM: ICD-10-CM

## 2023-09-20 PROCEDURE — 99213 OFFICE O/P EST LOW 20 MIN: CPT

## 2023-09-21 LAB
AFP-TM SERPL-MCNC: 3.6 NG/ML
ALBUMIN SERPL ELPH-MCNC: 4.3 G/DL
ALP BLD-CCNC: 111 U/L
ALT SERPL-CCNC: 13 U/L
ANION GAP SERPL CALC-SCNC: 12 MMOL/L
AST SERPL-CCNC: 32 U/L
BILIRUB SERPL-MCNC: 1.1 MG/DL
BUN SERPL-MCNC: 12 MG/DL
CALCIUM SERPL-MCNC: 8.8 MG/DL
CHLORIDE SERPL-SCNC: 102 MMOL/L
CO2 SERPL-SCNC: 25 MMOL/L
CREAT SERPL-MCNC: 0.93 MG/DL
EGFR: 89 ML/MIN/1.73M2
HCT VFR BLD CALC: 36.6 %
HGB BLD-MCNC: 12.3 G/DL
INR PPP: 1.17 RATIO
MCHC RBC-ENTMCNC: 33.3 PG
MCHC RBC-ENTMCNC: 33.6 GM/DL
MCV RBC AUTO: 99.2 FL
PLATELET # BLD AUTO: 57 K/UL
POTASSIUM SERPL-SCNC: 3.8 MMOL/L
PROT SERPL-MCNC: 6.9 G/DL
PT BLD: 13.2 SEC
RBC # BLD: 3.69 M/UL
RBC # FLD: 13.8 %
SODIUM SERPL-SCNC: 139 MMOL/L
WBC # FLD AUTO: 2.63 K/UL

## 2023-10-10 ENCOUNTER — APPOINTMENT (OUTPATIENT)
Dept: MRI IMAGING | Facility: CLINIC | Age: 68
End: 2023-10-10
Payer: MEDICARE

## 2023-10-10 ENCOUNTER — OUTPATIENT (OUTPATIENT)
Dept: OUTPATIENT SERVICES | Facility: HOSPITAL | Age: 68
LOS: 1 days | End: 2023-10-10
Payer: MEDICARE

## 2023-10-10 DIAGNOSIS — C22.0 LIVER CELL CARCINOMA: ICD-10-CM

## 2023-10-10 PROCEDURE — 74183 MRI ABD W/O CNTR FLWD CNTR: CPT | Mod: 26,MH

## 2023-10-10 PROCEDURE — 74183 MRI ABD W/O CNTR FLWD CNTR: CPT

## 2023-10-10 PROCEDURE — A9585: CPT

## 2023-10-24 PROBLEM — R77.2 ELEVATED ALPHA-FETOPROTEIN: Status: ACTIVE | Noted: 2021-11-09

## 2023-10-25 ENCOUNTER — APPOINTMENT (OUTPATIENT)
Dept: INTERVENTIONAL RADIOLOGY/VASCULAR | Facility: CLINIC | Age: 68
End: 2023-10-25

## 2023-10-25 DIAGNOSIS — R77.2 ABNORMALITY OF ALPHAFETOPROTEIN: ICD-10-CM

## 2023-10-25 DIAGNOSIS — R18.8 OTHER ASCITES: ICD-10-CM

## 2023-10-25 PROCEDURE — XXXXX: CPT

## 2023-12-20 ENCOUNTER — APPOINTMENT (OUTPATIENT)
Dept: GASTROENTEROLOGY | Facility: CLINIC | Age: 68
End: 2023-12-20
Payer: MEDICARE

## 2023-12-20 ENCOUNTER — OUTPATIENT (OUTPATIENT)
Dept: OUTPATIENT SERVICES | Facility: HOSPITAL | Age: 68
LOS: 1 days | End: 2023-12-20
Payer: MEDICARE

## 2023-12-20 ENCOUNTER — APPOINTMENT (OUTPATIENT)
Dept: RADIOLOGY | Facility: CLINIC | Age: 68
End: 2023-12-20
Payer: MEDICARE

## 2023-12-20 VITALS
HEIGHT: 63 IN | BODY MASS INDEX: 21.97 KG/M2 | DIASTOLIC BLOOD PRESSURE: 88 MMHG | WEIGHT: 124 LBS | SYSTOLIC BLOOD PRESSURE: 150 MMHG

## 2023-12-20 DIAGNOSIS — K74.60 UNSPECIFIED CIRRHOSIS OF LIVER: ICD-10-CM

## 2023-12-20 DIAGNOSIS — Z09 ENCOUNTER FOR FOLLOW-UP EXAMINATION AFTER COMPLETED TREATMENT FOR CONDITIONS OTHER THAN MALIGNANT NEOPLASM: ICD-10-CM

## 2023-12-20 LAB
HCT VFR BLD CALC: 38.2 %
HGB BLD-MCNC: 12.9 G/DL
MCHC RBC-ENTMCNC: 33.1 PG
MCHC RBC-ENTMCNC: 33.8 GM/DL
MCV RBC AUTO: 97.9 FL
PLATELET # BLD AUTO: 114 K/UL
RBC # BLD: 3.9 M/UL
RBC # FLD: 13.6 %
WBC # FLD AUTO: 3.06 K/UL

## 2023-12-20 PROCEDURE — 71046 X-RAY EXAM CHEST 2 VIEWS: CPT

## 2023-12-20 PROCEDURE — 99213 OFFICE O/P EST LOW 20 MIN: CPT

## 2023-12-20 PROCEDURE — 71046 X-RAY EXAM CHEST 2 VIEWS: CPT | Mod: 26

## 2023-12-20 NOTE — ASSESSMENT
[FreeTextEntry1] : 69 yo male with history of hepatitis C and cirrhosis.  Plan to obtain repeat imaging in January to evaluate liver.  Routine laboratory test including alpha-fetoprotein.  Will also send patient for PA lateral chest x-ray to rule out a possible pleural effusion.

## 2023-12-20 NOTE — PHYSICAL EXAM
[Alert] : alert [Normal Voice/Communication] : normal voice/communication [Healthy Appearing] : healthy appearing [No Acute Distress] : no acute distress [Sclera] : the sclera and conjunctiva were normal [Hearing Threshold Finger Rub Not [place]] : hearing was normal [Normal Lips/Gums] : the lips and gums were normal [Oropharynx] : the oropharynx was normal [Normal Appearance] : the appearance of the neck was normal [No Neck Mass] : no neck mass was observed [No Respiratory Distress] : no respiratory distress [No Acc Muscle Use] : no accessory muscle use [Respiration, Rhythm And Depth] : normal respiratory rhythm and effort [Auscultation Breath Sounds / Voice Sounds] : lungs were clear to auscultation bilaterally [Heart Rate And Rhythm] : heart rate was normal and rhythm regular [Normal S1, S2] : normal S1 and S2 [Murmurs] : no murmurs [Bowel Sounds] : normal bowel sounds [Abdomen Tenderness] : non-tender [No Masses] : no abdominal mass palpated [Abdomen Soft] : soft [] : no hepatosplenomegaly [Oriented To Time, Place, And Person] : oriented to person, place, and time

## 2023-12-20 NOTE — HISTORY OF PRESENT ILLNESS
[FreeTextEntry1] : Mr. JOSE R THOMASON is a 68 year old male with history of cirrhosis from hepatitis C.  Patient has also had ablation of 2 hepatocellular carcinomas.  Most recent imaging done in November did not show any evidence of recurrent disease.  Patient has no complaints of abdominal pain, peripheral edema or rectal bleeding.  Patient has noted some right-sided pleuritic chest pain which she ascribes to her recent pneumonia.

## 2023-12-21 LAB
AFP-TM SERPL-MCNC: 3.5 NG/ML
ALBUMIN SERPL ELPH-MCNC: 4.2 G/DL
ALP BLD-CCNC: 163 U/L
ALT SERPL-CCNC: 21 U/L
ANION GAP SERPL CALC-SCNC: 12 MMOL/L
AST SERPL-CCNC: 39 U/L
BILIRUB SERPL-MCNC: 1 MG/DL
BUN SERPL-MCNC: 14 MG/DL
CALCIUM SERPL-MCNC: 9.4 MG/DL
CHLORIDE SERPL-SCNC: 100 MMOL/L
CO2 SERPL-SCNC: 25 MMOL/L
CREAT SERPL-MCNC: 0.83 MG/DL
EGFR: 95 ML/MIN/1.73M2
POTASSIUM SERPL-SCNC: 4.7 MMOL/L
PROT SERPL-MCNC: 7 G/DL
SODIUM SERPL-SCNC: 138 MMOL/L

## 2023-12-21 RX ORDER — CHLORHEXIDINE GLUCONATE 4 %
325 (65 FE) LIQUID (ML) TOPICAL DAILY
Qty: 90 | Refills: 1 | Status: ACTIVE | COMMUNITY
Start: 2023-12-21 | End: 1900-01-01

## 2024-01-10 ENCOUNTER — APPOINTMENT (OUTPATIENT)
Dept: MRI IMAGING | Facility: CLINIC | Age: 69
End: 2024-01-10
Payer: MEDICARE

## 2024-01-10 ENCOUNTER — OUTPATIENT (OUTPATIENT)
Dept: OUTPATIENT SERVICES | Facility: HOSPITAL | Age: 69
LOS: 1 days | End: 2024-01-10
Payer: MEDICARE

## 2024-01-10 DIAGNOSIS — C22.0 LIVER CELL CARCINOMA: ICD-10-CM

## 2024-01-10 PROCEDURE — 74183 MRI ABD W/O CNTR FLWD CNTR: CPT

## 2024-01-10 PROCEDURE — 74183 MRI ABD W/O CNTR FLWD CNTR: CPT | Mod: 26,MH

## 2024-01-10 PROCEDURE — A9585: CPT

## 2024-01-18 ENCOUNTER — APPOINTMENT (OUTPATIENT)
Dept: INTERVENTIONAL RADIOLOGY/VASCULAR | Facility: CLINIC | Age: 69
End: 2024-01-18
Payer: MEDICARE

## 2024-01-18 PROCEDURE — 99441: CPT | Mod: 93

## 2024-01-18 NOTE — ASU DISCHARGE PLAN (ADULT/PEDIATRIC) - BRAND OF FIRST COVID-19 BOOSTER
[employer]
infant moving all extremities equally against gravity, noted hands to face & mouth in supine & right sidelying position

## 2024-01-18 NOTE — ASSESSMENT
[Other: _____] : [unfilled] [FreeTextEntry1] : This is a a 68 year old male with pmhx of HCV, cirrhosis, OA in right knee, lumbar disc degeneration, and of HCC who presents to IR today for follow up for liver directed therapy.   1. HCC - now s/p Y90 to segment 5 on 3/3/22 and Y90 to segment 8 on 1/18/23 - 1/10/24 MRI demonstrates stable posttreatment changes in the right lobe without evidence of viable tumor. No new liver lesions.  - I have reviewed their imaging and discussed the findings with  PADDY.   - The imaging does not show any evidence of viable tumor and it has now been 1 year since last treatment, so recommending repeat imaging in 6 months.  - f/u MRI 6 months - f/u telehealth after imaging  to review the findings. - While there is no indication for intervention at this time, I reinforced the importance of continued surveillance with serial imaging. - continue with consistent follow ups with GI - pt prefers to f/u with Dr. Eilas instead of hepatology.  I have provided the patient the opportunity to ask questions and have answered them to their satisfaction.  They are encouraged to contact our office with any further questions, concerns, or issues.

## 2024-01-18 NOTE — PHYSICAL EXAM
[Alert] : alert [No Acute Distress] : no acute distress [Normal Voice/Communication] : normal voice communication [No Respiratory Distress] : no respiratory distress [Oriented x3] : oriented to person, place, and time [Normal Insight/Judgement] : insight and judgment were intact [Normal Affect] : the affect was normal [Normal Mood] : the mood was normal [Recent Memory Normal] : recent memory was not impaired [Remote Memory Normal] : remote memory was not impaired [Restricted in physically strenuous activity but ambulatory and able to carry out work of a light or sedentary nature] : Restricted in physically strenuous activity but ambulatory and able to carry out work of a light or sedentary nature, e.g., light house work, office work

## 2024-01-18 NOTE — HISTORY OF PRESENT ILLNESS
[Home] : at home, [unfilled] , at the time of the visit. [Medical Office: ([facility])___] : at the medical office located in  [Verbal consent obtained from patient] : the patient, [unfilled] [0] : ~His/Her~ pain was 0 out of 10 [FreeTextEntry1] : This is a a 68 year old male with pmhx of HCV, cirrhosis, OA in right knee, lumbar disc degeneration and  HCC who presents to IR today for follow up for liver directed therapy.   Pt was diagnosed with cirrhosis/HCV during hospitalization in 2014 for hemoptysis from esophageal varices. He was recently referred to Dr. Irving by Dr. Marcus Elias for newly dx HCC. He has a history of cirrhosis and hepatitis C. Patient just completed his course of Harvoni, his most recent PCR (11/8/21) not detected.   He was noted to have liver lesion on screening imaging, referred to IR by Dr. Irving to discuss liver directed therapy in 12/2021.  He is now s/p Y90 to segment 5 on 3/3/22 and Y90 to segment 8 on 1/18/23.    Previous follow ups he reported to be feeling well, but endorsed persistent fatigue.  Fatigue level has been consistent over the last few years, since diagnosed with cirrhosis.  Currently ......  Was working with transplant team, having some issues with staff regarding timely completion of workup.  He is not interested in pursing transplant.  Has been following with GI, Dr. Elias.   He has decompensated disease in the form of ascites. He required 1 paracentesis (2L) about 2014, the same year he reports EV bleed at [facility]. Reports this was his last hospitalization. No further episodes of decompensation.   Today he presents for follow up. He recently saw Dr. Elias and mentioned some right pleuritic pain.  He was sent for CXR, which was negative.   Mr. THOMASON reports feeling well. No acute complaints. Had MRI 1/10/2024.  Hep: Maria Fernanda GI: Marcus Elias [phone]

## 2024-01-18 NOTE — REASON FOR VISIT
[Follow-Up: _____] : a [unfilled] follow-up visit [Spouse] : spouse [Home] : at home, [unfilled] , at the time of the visit. [Medical Office: ([facility])___] : at the medical office located in  [Verbal consent obtained from patient] : the patient, [unfilled]

## 2024-01-19 ENCOUNTER — NON-APPOINTMENT (OUTPATIENT)
Age: 69
End: 2024-01-19

## 2024-02-02 RX ORDER — PROPRANOLOL HYDROCHLORIDE 20 MG/1
20 TABLET ORAL
Qty: 180 | Refills: 3 | Status: ACTIVE | COMMUNITY
Start: 2019-08-06 | End: 1900-01-01

## 2024-02-08 ENCOUNTER — APPOINTMENT (OUTPATIENT)
Dept: GASTROENTEROLOGY | Facility: CLINIC | Age: 69
End: 2024-02-08
Payer: MEDICARE

## 2024-02-08 VITALS
DIASTOLIC BLOOD PRESSURE: 90 MMHG | HEIGHT: 63 IN | BODY MASS INDEX: 21.97 KG/M2 | WEIGHT: 124 LBS | SYSTOLIC BLOOD PRESSURE: 158 MMHG

## 2024-02-08 DIAGNOSIS — Z09 ENCOUNTER FOR FOLLOW-UP EXAMINATION AFTER COMPLETED TREATMENT FOR CONDITIONS OTHER THAN MALIGNANT NEOPLASM: ICD-10-CM

## 2024-02-08 PROCEDURE — 99213 OFFICE O/P EST LOW 20 MIN: CPT

## 2024-02-08 NOTE — HISTORY OF PRESENT ILLNESS
[FreeTextEntry1] : Mr. JOSE R THOMASON is a 69 year old male with history of hepatitis C with cirrhosis and recurrent prostate carcinoma.  Patient has had multiple ablations and a recent MRI of the liver did not show any evidence of recurrence.  Patient has been feeling clinically well with resolution of his right pleural discomfort.  Of note, patient does note some personality changes with easily provokable anger.  There is no day night reversal.  This is a new finding for the patient.

## 2024-02-08 NOTE — PHYSICAL EXAM
[Alert] : alert [Normal Voice/Communication] : normal voice/communication [Healthy Appearing] : healthy appearing [No Acute Distress] : no acute distress [Sclera] : the sclera and conjunctiva were normal [Hearing Threshold Finger Rub Not [place]] : hearing was normal [Normal Lips/Gums] : the lips and gums were normal [Oropharynx] : the oropharynx was normal [Normal Appearance] : the appearance of the neck was normal [No Neck Mass] : no neck mass was observed [No Respiratory Distress] : no respiratory distress [No Acc Muscle Use] : no accessory muscle use [Respiration, Rhythm And Depth] : normal respiratory rhythm and effort [Auscultation Breath Sounds / Voice Sounds] : lungs were clear to auscultation bilaterally [Heart Rate And Rhythm] : heart rate was normal and rhythm regular [Normal S1, S2] : normal S1 and S2 [Murmurs] : no murmurs [Bowel Sounds] : normal bowel sounds [Abdomen Tenderness] : non-tender [No Masses] : no abdominal mass palpated [Abdomen Soft] : soft [Oriented To Time, Place, And Person] : oriented to person, place, and time [] : no hepatosplenomegaly [de-identified] : No asterixis

## 2024-02-08 NOTE — ASSESSMENT
[FreeTextEntry1] : 68 yo male with history of cirrhosis.  Plan to obtain serum ammonia level to look for evidence of encephalopathy.  He does not have asterixis on exam.  Follow up three months or sooner if evidence of encephalopathy.  Patient also advised to seek neurologic opinion.

## 2024-02-09 LAB
ALBUMIN SERPL ELPH-MCNC: 3.9 G/DL
ALP BLD-CCNC: 123 U/L
ALT SERPL-CCNC: 17 U/L
ANION GAP SERPL CALC-SCNC: 15 MMOL/L
AST SERPL-CCNC: 32 U/L
BILIRUB SERPL-MCNC: 0.8 MG/DL
BUN SERPL-MCNC: 15 MG/DL
CALCIUM SERPL-MCNC: 9 MG/DL
CHLORIDE SERPL-SCNC: 96 MMOL/L
CO2 SERPL-SCNC: 25 MMOL/L
CREAT SERPL-MCNC: 0.79 MG/DL
EGFR: 96 ML/MIN/1.73M2
HCT VFR BLD CALC: 38.7 %
HGB BLD-MCNC: 13.3 G/DL
MCHC RBC-ENTMCNC: 32.9 PG
MCHC RBC-ENTMCNC: 34.4 GM/DL
MCV RBC AUTO: 95.8 FL
PLATELET # BLD AUTO: 155 K/UL
POTASSIUM SERPL-SCNC: 4 MMOL/L
PROT SERPL-MCNC: 7.1 G/DL
RBC # BLD: 4.04 M/UL
RBC # FLD: 13.1 %
SODIUM SERPL-SCNC: 136 MMOL/L
WBC # FLD AUTO: 3.37 K/UL

## 2024-02-10 LAB — AMMONIA PLAS-MCNC: 86.8 UMOL/L

## 2024-03-21 ENCOUNTER — APPOINTMENT (OUTPATIENT)
Dept: GASTROENTEROLOGY | Facility: CLINIC | Age: 69
End: 2024-03-21
Payer: MEDICARE

## 2024-03-21 VITALS
DIASTOLIC BLOOD PRESSURE: 84 MMHG | SYSTOLIC BLOOD PRESSURE: 140 MMHG | HEIGHT: 63 IN | BODY MASS INDEX: 21.97 KG/M2 | WEIGHT: 124 LBS

## 2024-03-21 DIAGNOSIS — K76.82 HEPATIC ENCEPHALOPATHY: ICD-10-CM

## 2024-03-21 PROCEDURE — 99212 OFFICE O/P EST SF 10 MIN: CPT

## 2024-03-21 NOTE — PHYSICAL EXAM
[Alert] : alert [Normal Voice/Communication] : normal voice/communication [Healthy Appearing] : healthy appearing [Sclera] : the sclera and conjunctiva were normal [No Acute Distress] : no acute distress [Hearing Threshold Finger Rub Not [place]] : hearing was normal [Normal Lips/Gums] : the lips and gums were normal [Oropharynx] : the oropharynx was normal [Normal Appearance] : the appearance of the neck was normal [No Neck Mass] : no neck mass was observed [No Respiratory Distress] : no respiratory distress [No Acc Muscle Use] : no accessory muscle use [Respiration, Rhythm And Depth] : normal respiratory rhythm and effort [Heart Rate And Rhythm] : heart rate was normal and rhythm regular [Auscultation Breath Sounds / Voice Sounds] : lungs were clear to auscultation bilaterally [Murmurs] : no murmurs [Normal S1, S2] : normal S1 and S2 [Bowel Sounds] : normal bowel sounds [Abdomen Tenderness] : non-tender [No Masses] : no abdominal mass palpated [Abdomen Soft] : soft [] : no hepatosplenomegaly [Oriented To Time, Place, And Person] : oriented to person, place, and time [de-identified] : No asterixis on examination

## 2024-03-21 NOTE — ASSESSMENT
[FreeTextEntry1] : 68 yo male with history of cirrhosis and hepatic encephalopathy which is respond to the use of Xifaxan.  Plan to obtain serum ammonia testing.  Follow up in three months.  Patient to call with any further changes.

## 2024-03-21 NOTE — HISTORY OF PRESENT ILLNESS
[FreeTextEntry1] : Mr. JOSE R THOMASON is a 69 year old male with history of cirrhosis and hepatocellular carcinoma.  Patient had recently noted some personality changes.  His ammonia level was elevated and was started on Xifaxan.  Since that time, patient has noted marked improvement with return to baseline of his personality.  There is no complaints of asterixis.

## 2024-03-21 NOTE — REASON FOR VISIT
[Consultation] : a consultation visit [FreeTextEntry1] : history of cirrhosis and hepatic encephalopathy

## 2024-04-01 NOTE — ASU PREOP CHECKLIST - NS PREOP CHK TEST_COVID RESULT_GEN_ALL_CORE
Cantharone is a liquid acid that is painted on the warts in the office and at the time of application which is painless. This medicine will produce a blister over the area that it was applied on.  These blister can get as large as an inch.  Medication should be washed off in  4-6 hours or sooner if it becomes painful.  The area should be cleansed, soaking in a lukewarm bath is best.  A sterile needle should be used to pop the blisters as this will help alleviate the discomfort.  After the blister has popped, vaseline or and antibiotic ointment should be applied twice a day for one week    Negative

## 2024-05-09 ENCOUNTER — APPOINTMENT (OUTPATIENT)
Dept: GASTROENTEROLOGY | Facility: CLINIC | Age: 69
End: 2024-05-09

## 2024-05-18 NOTE — ED ADULT TRIAGE NOTE - NS ED TRIAGE AVPU SCALE
Alert-The patient is alert, awake and responds to voice. The patient is oriented to time, place, and person. The triage nurse is able to obtain subjective information.
abdomen soft, mild epigastric tenderness, and non-distended. Bowel sounds present.

## 2024-06-14 ENCOUNTER — APPOINTMENT (OUTPATIENT)
Dept: GASTROENTEROLOGY | Facility: CLINIC | Age: 69
End: 2024-06-14
Payer: MEDICARE

## 2024-06-14 VITALS — HEIGHT: 63 IN | BODY MASS INDEX: 21.26 KG/M2 | WEIGHT: 120 LBS

## 2024-06-14 DIAGNOSIS — K74.60 UNSPECIFIED CIRRHOSIS OF LIVER: ICD-10-CM

## 2024-06-14 DIAGNOSIS — Z09 ENCOUNTER FOR FOLLOW-UP EXAMINATION AFTER COMPLETED TREATMENT FOR CONDITIONS OTHER THAN MALIGNANT NEOPLASM: ICD-10-CM

## 2024-06-14 DIAGNOSIS — C22.0 LIVER CELL CARCINOMA: ICD-10-CM

## 2024-06-14 DIAGNOSIS — B18.2 CHRONIC VIRAL HEPATITIS C: ICD-10-CM

## 2024-06-14 PROCEDURE — 99213 OFFICE O/P EST LOW 20 MIN: CPT

## 2024-06-14 RX ORDER — RIFAXIMIN 550 MG/1
550 TABLET ORAL
Qty: 60 | Refills: 4 | Status: ACTIVE | COMMUNITY
Start: 2024-02-21 | End: 1900-01-01

## 2024-06-14 NOTE — HISTORY OF PRESENT ILLNESS
[FreeTextEntry1] : Mr. JOSE R THOMASON is a 69 year old male with history of cirrhosis and hepatocellular carcinoma.  Patient also has a history of hepatic encephalopathy which has been responding to Xifaxan well.  Currently patient feels well with no weight loss or peripheral edema.  There has been no bleeding.  His confusion appears to have resolved.  He is due for MRI for surveillance for HCC.

## 2024-06-14 NOTE — PHYSICAL EXAM
[Alert] : alert [Normal Voice/Communication] : normal voice/communication [Healthy Appearing] : healthy appearing [No Acute Distress] : no acute distress [Sclera] : the sclera and conjunctiva were normal [Hearing Threshold Finger Rub Not [place]] : hearing was normal [Normal Lips/Gums] : the lips and gums were normal [Oropharynx] : the oropharynx was normal [Normal Appearance] : the appearance of the neck was normal [No Neck Mass] : no neck mass was observed [No Respiratory Distress] : no respiratory distress [No Acc Muscle Use] : no accessory muscle use [Respiration, Rhythm And Depth] : normal respiratory rhythm and effort [Auscultation Breath Sounds / Voice Sounds] : lungs were clear to auscultation bilaterally [Heart Rate And Rhythm] : heart rate was normal and rhythm regular [Normal S1, S2] : normal S1 and S2 [Murmurs] : no murmurs [Bowel Sounds] : normal bowel sounds [Abdomen Tenderness] : non-tender [No Masses] : no abdominal mass palpated [Abdomen Soft] : soft [Oriented To Time, Place, And Person] : oriented to person, place, and time [] : no hepatosplenomegaly

## 2024-06-14 NOTE — ASSESSMENT
[FreeTextEntry1] : 70 yo male with history of cirrhosis and hepatocellular carcinoma.  Plan to obtain laboratory test including alpha-fetoprotein and ammonia.  MRI for surveillance.  Continue Xifaxan.  Follow up in six months.

## 2024-06-20 ENCOUNTER — NON-APPOINTMENT (OUTPATIENT)
Age: 69
End: 2024-06-20

## 2024-06-20 LAB
AFP-TM SERPL-MCNC: 5.1 NG/ML
ALBUMIN SERPL ELPH-MCNC: 4.2 G/DL
ALP BLD-CCNC: 85 U/L
ALT SERPL-CCNC: 13 U/L
AMMONIA PLAS-MCNC: 50.2 UMOL/L
ANION GAP SERPL CALC-SCNC: 10 MMOL/L
AST SERPL-CCNC: 28 U/L
BILIRUB SERPL-MCNC: 1 MG/DL
BUN SERPL-MCNC: 11 MG/DL
CALCIUM SERPL-MCNC: 8.8 MG/DL
CHLORIDE SERPL-SCNC: 102 MMOL/L
CO2 SERPL-SCNC: 23 MMOL/L
CREAT SERPL-MCNC: 0.95 MG/DL
EGFR: 87 ML/MIN/1.73M2
HCT VFR BLD CALC: 36 %
HGB BLD-MCNC: 12.3 G/DL
INR PPP: 1.17 RATIO
MCHC RBC-ENTMCNC: 33.2 PG
MCHC RBC-ENTMCNC: 34.2 GM/DL
MCV RBC AUTO: 97.3 FL
PLATELET # BLD AUTO: 66 K/UL
POTASSIUM SERPL-SCNC: 4.3 MMOL/L
PROT SERPL-MCNC: 6.5 G/DL
PT BLD: 13.2 SEC
RBC # BLD: 3.7 M/UL
RBC # FLD: 14.1 %
SODIUM SERPL-SCNC: 136 MMOL/L
WBC # FLD AUTO: 3.1 K/UL

## 2024-07-10 ENCOUNTER — OUTPATIENT (OUTPATIENT)
Dept: OUTPATIENT SERVICES | Facility: HOSPITAL | Age: 69
LOS: 1 days | End: 2024-07-10
Payer: MEDICARE

## 2024-07-10 ENCOUNTER — APPOINTMENT (OUTPATIENT)
Dept: MRI IMAGING | Facility: CLINIC | Age: 69
End: 2024-07-10
Payer: MEDICARE

## 2024-07-10 DIAGNOSIS — C22.0 LIVER CELL CARCINOMA: ICD-10-CM

## 2024-07-10 PROCEDURE — 74183 MRI ABD W/O CNTR FLWD CNTR: CPT

## 2024-07-10 PROCEDURE — A9585: CPT

## 2024-07-10 PROCEDURE — 74183 MRI ABD W/O CNTR FLWD CNTR: CPT | Mod: 26,MH

## 2024-07-26 ENCOUNTER — APPOINTMENT (OUTPATIENT)
Dept: INTERVENTIONAL RADIOLOGY/VASCULAR | Facility: CLINIC | Age: 69
End: 2024-07-26

## 2024-07-26 DIAGNOSIS — C22.0 LIVER CELL CARCINOMA: ICD-10-CM

## 2024-07-26 NOTE — HISTORY OF PRESENT ILLNESS
[FreeTextEntry1] :  This visit was provided via telehealth using real-time 2 way audio visual technology initially, but converted to audio only due to technical difficulties. The patient, JOSE R THOMASON, was located at home, [address] [place] [address] , at the time of the visit. The provider, Nahed Perez NP, was located at the medical office located in [facility] at the time of the visit. The patient, JOSE R THOMASON and Provider participated in the telehealth encounter. Verbal consent for telehealth services was given on  07/26/2024 by the patient, JOSE R THOMASON .  This is a a 69 year-old male with pmhx of HCV, cirrhosis, OA in right knee, lumbar disc degeneration and HCC who presents to IR today for follow up for liver directed therapy.   Pt was diagnosed with cirrhosis/HCV during hospitalization in 2014 for hemoptysis from esophageal varices. He was recently referred to Dr. Irving by Dr. Marcus Elias for newly dx HCC. He has a history of cirrhosis and hepatitis C. Patient completed his course of Harvoni, his most recent PCR (11/8/21) not detected.   He was noted to have liver lesion on screening imaging, referred to IR by Dr. Irving to discuss liver directed therapy in 12/2021.  He is now s/p Y90 to segment 5 on 3/3/22 and Y90 to segment 8 on 1/18/23.    Previous follow ups he reported to be feeling well but endorsed persistent fatigue.  Fatigue level has been consistent over the last few years, since diagnosed with cirrhosis.    He is not interested in pursing transplant.  Has been following with GI, Dr. Elias.   He has decompensated disease in the form of ascites. He required 1 paracentesis (2L) about 2014, the same year he reports EV bleed at [facility]. Reports this was his last hospitalization. No further episodes of decompensation.   Mr. THOMASON reports feeling well. No acute complaints. Starting taking Xifaxan a few months ago for elevated ammonia. Doing better.  Hep: [facility] GI: Marcus Elias [phone]  [0] : ~His/Her~ pain was 0 out of 10

## 2024-07-26 NOTE — HISTORY OF PRESENT ILLNESS
[FreeTextEntry1] :  This visit was provided via telehealth using real-time 2 way audio visual technology initially, but converted to audio only due to technical difficulties. The patient, JOSE R THOMASON, was located at home, [address] [place] [address] , at the time of the visit. The provider, Nahed Perez NP, was located at the medical office located in [facility] at the time of the visit. The patient, JOSE R THOMASON and Provider participated in the telehealth encounter. Verbal consent for telehealth services was given on  07/26/2024 by the patient, JOSE R THOMASON .  This is a a 69 year-old male with pmhx of HCV, cirrhosis, OA in right knee, lumbar disc degeneration and HCC who presents to IR today for follow up for liver directed therapy.   Pt was diagnosed with cirrhosis/HCV during hospitalization in 2014 for hemoptysis from esophageal varices. He was recently referred to Dr. Irving by Dr. Marcus Elias for newly dx HCC. He has a history of cirrhosis and hepatitis C. Patient completed his course of Harvoni, his most recent PCR (11/8/21) not detected.   He was noted to have liver lesion on screening imaging, referred to IR by Dr. Irving to discuss liver directed therapy in 12/2021.  He is now s/p Y90 to segment 5 on 3/3/22 and Y90 to segment 8 on 1/18/23.    Previous follow ups he reported to be feeling well but endorsed persistent fatigue.  Fatigue level has been consistent over the last few years, since diagnosed with cirrhosis.    He is not interested in pursing transplant.  Has been following with GI, Dr. Elias.   He has decompensated disease in the form of ascites. He required 1 paracentesis (2L) about 2014, the same year he reports EV bleed at [facility]. Reports this was his last hospitalization. No further episodes of decompensation.   Mr. THOMASON reports feeling well. No acute complaints. Starting taking Xifaxan a few months ago for elevated ammonia. Doing better.  Hep: [facility] GI: Marcus Elias [phone]  [0] : ~His/Her~ pain was 0 out of 10

## 2024-07-26 NOTE — ASSESSMENT
[FreeTextEntry1] : This is a a 68 year old male with pmhx of HCV, cirrhosis, OA in right knee, lumbar disc degeneration, and of HCC who presents to IR today for follow up for liver directed therapy.   1. HCC - now s/p Y90 to segment 5 on 3/3/22 and Y90 to segment 8 on 1/18/23 - 7/10/24 MRI demonstrates post treatment changes in the right hepatic lobe with associated volume loss. No evidence of viable disease at the treatment sites (LR-TR nonviable). There is a new 0.5 cm lesion in segment 7  LR-3.  - imaging reviewed by Dr. Marte and I d/w patient - the previously treated areas are nonviable, but there is a new LR-3 lesion in segment 7 that will need to be followed closely.  - he expressed some concern about intermittent claustrophobia while having the MRI.  Discussed option of taking sedative prior to having done.  If he wishes to pursue this option, he should discuss further with PMD.  - MRI in 3 months - f/u telehealth after imaging to review the findings. - While there is no indication for intervention at this time, I reinforced the importance of continued surveillance with serial imaging. - continue with consistent follow ups with GI - pt prefers to f/u with Dr. Elias instead of hepatology.  I have provided the patient the opportunity to ask questions and have answered them to their satisfaction.  They are encouraged to contact our office with any further questions, concerns, or issues.  Above case and plan d/w Dr. Marte  [Other: _____] : [unfilled]

## 2024-08-21 ENCOUNTER — APPOINTMENT (OUTPATIENT)
Dept: GASTROENTEROLOGY | Facility: CLINIC | Age: 69
End: 2024-08-21
Payer: MEDICARE

## 2024-08-21 VITALS
BODY MASS INDEX: 21.26 KG/M2 | SYSTOLIC BLOOD PRESSURE: 142 MMHG | HEIGHT: 63 IN | DIASTOLIC BLOOD PRESSURE: 70 MMHG | WEIGHT: 120 LBS

## 2024-08-21 DIAGNOSIS — K74.60 UNSPECIFIED CIRRHOSIS OF LIVER: ICD-10-CM

## 2024-08-21 PROCEDURE — 99213 OFFICE O/P EST LOW 20 MIN: CPT

## 2024-08-21 NOTE — PHYSICAL EXAM

## 2024-08-21 NOTE — HISTORY OF PRESENT ILLNESS
[FreeTextEntry1] : Mr. JOSE R THOMASON is a 69 year old male with history of cirrhosis.  Patient has been feeling clinically well.  Prior episodes of confusion have resolved there is been no complaints of abdominal discomfort or peripheral edema.  Patient had surveillance MRI for history of hepatocellular carcinoma.  A new lesion five mm in size was identified and recommended close follow-up.  Natural history of cirrhosis and increased risk of hepatocellular carcinoma discussed at length with patient.

## 2024-08-21 NOTE — ASSESSMENT
[FreeTextEntry1] : 68 yo male with history of cirrhosis.  Patient is currently somewhat compensated.  Patient is scheduled for repeat MRI in October of this year for surveillance and may need further treatment if this is a new hepatocellular carcinoma.  Plan to obtain laboratory test including alpha-fetoprotein.  Patient advised that the natural history of cirrhosis is that he is at higher risk for hepatocellular carcinoma and may benefit from liver transplantation.  Patient understands and agrees to follow closely.

## 2024-08-22 LAB
AFP-TM SERPL-MCNC: 6.7 NG/ML
ALBUMIN SERPL ELPH-MCNC: 4.1 G/DL
ALP BLD-CCNC: 89 U/L
ALT SERPL-CCNC: 12 U/L
ANION GAP SERPL CALC-SCNC: 15 MMOL/L
AST SERPL-CCNC: 29 U/L
BILIRUB SERPL-MCNC: 1.2 MG/DL
BUN SERPL-MCNC: 10 MG/DL
CALCIUM SERPL-MCNC: 8.8 MG/DL
CHLORIDE SERPL-SCNC: 102 MMOL/L
CO2 SERPL-SCNC: 24 MMOL/L
CREAT SERPL-MCNC: 0.97 MG/DL
EGFR: 85 ML/MIN/1.73M2
HCT VFR BLD CALC: 36 %
HGB BLD-MCNC: 12.2 G/DL
MCHC RBC-ENTMCNC: 33.4 PG
MCHC RBC-ENTMCNC: 33.9 GM/DL
MCV RBC AUTO: 98.6 FL
PLATELET # BLD AUTO: 65 K/UL
POTASSIUM SERPL-SCNC: 4.3 MMOL/L
PROT SERPL-MCNC: 6.6 G/DL
RBC # BLD: 3.65 M/UL
RBC # FLD: 13.4 %
SODIUM SERPL-SCNC: 140 MMOL/L
WBC # FLD AUTO: 3.13 K/UL

## 2024-10-10 ENCOUNTER — APPOINTMENT (OUTPATIENT)
Dept: MRI IMAGING | Facility: CLINIC | Age: 69
End: 2024-10-10

## 2024-10-10 ENCOUNTER — OUTPATIENT (OUTPATIENT)
Dept: OUTPATIENT SERVICES | Facility: HOSPITAL | Age: 69
LOS: 1 days | End: 2024-10-10
Payer: MEDICARE

## 2024-10-10 DIAGNOSIS — C22.0 LIVER CELL CARCINOMA: ICD-10-CM

## 2024-10-10 PROCEDURE — 74183 MRI ABD W/O CNTR FLWD CNTR: CPT | Mod: 26,MH

## 2024-10-17 ENCOUNTER — APPOINTMENT (OUTPATIENT)
Dept: GASTROENTEROLOGY | Facility: CLINIC | Age: 69
End: 2024-10-17
Payer: MEDICARE

## 2024-10-17 ENCOUNTER — NON-APPOINTMENT (OUTPATIENT)
Age: 69
End: 2024-10-17

## 2024-10-17 VITALS — WEIGHT: 120 LBS | BODY MASS INDEX: 21.26 KG/M2 | HEIGHT: 63 IN

## 2024-10-17 DIAGNOSIS — Z09 ENCOUNTER FOR FOLLOW-UP EXAMINATION AFTER COMPLETED TREATMENT FOR CONDITIONS OTHER THAN MALIGNANT NEOPLASM: ICD-10-CM

## 2024-10-17 PROCEDURE — 99214 OFFICE O/P EST MOD 30 MIN: CPT

## 2024-10-22 ENCOUNTER — APPOINTMENT (OUTPATIENT)
Dept: INTERVENTIONAL RADIOLOGY/VASCULAR | Facility: CLINIC | Age: 69
End: 2024-10-22

## 2024-10-22 DIAGNOSIS — B18.2 CHRONIC VIRAL HEPATITIS C: ICD-10-CM

## 2024-10-22 DIAGNOSIS — K74.60 UNSPECIFIED CIRRHOSIS OF LIVER: ICD-10-CM

## 2024-10-22 DIAGNOSIS — R53.82 CHRONIC FATIGUE, UNSPECIFIED: ICD-10-CM

## 2024-10-22 DIAGNOSIS — C22.0 LIVER CELL CARCINOMA: ICD-10-CM

## 2024-10-22 DIAGNOSIS — K76.6 PORTAL HYPERTENSION: ICD-10-CM

## 2024-10-22 DIAGNOSIS — K76.82 HEPATIC ENCEPHALOPATHY: ICD-10-CM

## 2024-11-20 PROCEDURE — 74183 MRI ABD W/O CNTR FLWD CNTR: CPT

## 2024-11-20 PROCEDURE — A9585: CPT

## 2024-11-26 NOTE — ASU PREOP CHECKLIST - VERIFY SURGICAL SITE/SIDE WITH PATIENT
Spoke to pt. who states he is aware and understands the result notes.    ----- Message from Heaven Ardon, DO sent at 11/26/2024  8:17 AM EST -----  Hi Syed,   Your thyroid, cholesterol, and blood counts look good. Your blood sugar is still pretty elevated and your PSA is elevated compared to the last time it was checked 4 years ago. I would like to repeat the PSA in 6-8 weeks. I will order it now. You can schedule to have it done in our office or go to the lab. Please feel free to reach out to our office with any questions or concerns.   -   done

## 2024-12-16 ENCOUNTER — RX RENEWAL (OUTPATIENT)
Age: 69
End: 2024-12-16

## 2025-03-10 ENCOUNTER — OUTPATIENT (OUTPATIENT)
Dept: OUTPATIENT SERVICES | Facility: HOSPITAL | Age: 70
LOS: 1 days | End: 2025-03-10
Payer: MEDICARE

## 2025-03-10 ENCOUNTER — APPOINTMENT (OUTPATIENT)
Dept: MRI IMAGING | Facility: CLINIC | Age: 70
End: 2025-03-10
Payer: MEDICARE

## 2025-03-10 DIAGNOSIS — C22.0 LIVER CELL CARCINOMA: ICD-10-CM

## 2025-03-10 PROCEDURE — 74183 MRI ABD W/O CNTR FLWD CNTR: CPT | Mod: 26

## 2025-03-10 PROCEDURE — 74183 MRI ABD W/O CNTR FLWD CNTR: CPT

## 2025-03-21 ENCOUNTER — APPOINTMENT (OUTPATIENT)
Dept: INTERVENTIONAL RADIOLOGY/VASCULAR | Facility: CLINIC | Age: 70
End: 2025-03-21

## 2025-03-21 DIAGNOSIS — R77.2 ABNORMALITY OF ALPHAFETOPROTEIN: ICD-10-CM

## 2025-03-21 DIAGNOSIS — I85.10 UNSPECIFIED CIRRHOSIS OF LIVER: ICD-10-CM

## 2025-03-21 DIAGNOSIS — K76.82 HEPATIC ENCEPHALOPATHY: ICD-10-CM

## 2025-03-21 DIAGNOSIS — K74.60 UNSPECIFIED CIRRHOSIS OF LIVER: ICD-10-CM

## 2025-03-21 PROCEDURE — 99214 OFFICE O/P EST MOD 30 MIN: CPT | Mod: 2W

## 2025-03-28 ENCOUNTER — NON-APPOINTMENT (OUTPATIENT)
Age: 70
End: 2025-03-28

## 2025-03-28 ENCOUNTER — APPOINTMENT (OUTPATIENT)
Dept: GASTROENTEROLOGY | Facility: CLINIC | Age: 70
End: 2025-03-28
Payer: MEDICARE

## 2025-03-28 VITALS
HEIGHT: 63 IN | DIASTOLIC BLOOD PRESSURE: 70 MMHG | BODY MASS INDEX: 22.15 KG/M2 | WEIGHT: 125 LBS | SYSTOLIC BLOOD PRESSURE: 134 MMHG

## 2025-03-28 DIAGNOSIS — K74.60 UNSPECIFIED CIRRHOSIS OF LIVER: ICD-10-CM

## 2025-03-28 DIAGNOSIS — Z09 ENCOUNTER FOR FOLLOW-UP EXAMINATION AFTER COMPLETED TREATMENT FOR CONDITIONS OTHER THAN MALIGNANT NEOPLASM: ICD-10-CM

## 2025-03-28 DIAGNOSIS — C22.0 LIVER CELL CARCINOMA: ICD-10-CM

## 2025-03-28 PROCEDURE — 99213 OFFICE O/P EST LOW 20 MIN: CPT

## 2025-03-31 LAB
AFP-TM SERPL-MCNC: 31.7 NG/ML
ALBUMIN SERPL ELPH-MCNC: 4.4 G/DL
ALP BLD-CCNC: 92 U/L
ALT SERPL-CCNC: 13 U/L
ANION GAP SERPL CALC-SCNC: 10 MMOL/L
AST SERPL-CCNC: 25 U/L
BILIRUB SERPL-MCNC: 1.1 MG/DL
BUN SERPL-MCNC: 11 MG/DL
CALCIUM SERPL-MCNC: 9.2 MG/DL
CHLORIDE SERPL-SCNC: 99 MMOL/L
CO2 SERPL-SCNC: 26 MMOL/L
CREAT SERPL-MCNC: 0.92 MG/DL
EGFRCR SERPLBLD CKD-EPI 2021: 89 ML/MIN/1.73M2
HCT VFR BLD CALC: 36.7 %
HGB BLD-MCNC: 12.7 G/DL
INR PPP: 1.16 RATIO
MCHC RBC-ENTMCNC: 33.8 PG
MCHC RBC-ENTMCNC: 34.6 G/DL
MCV RBC AUTO: 97.6 FL
PLATELET # BLD AUTO: 81 K/UL
POTASSIUM SERPL-SCNC: 4.4 MMOL/L
PROT SERPL-MCNC: 6.9 G/DL
PT BLD: 13.8 SEC
RBC # BLD: 3.76 M/UL
RBC # FLD: 13.7 %
SODIUM SERPL-SCNC: 135 MMOL/L
WBC # FLD AUTO: 3.91 K/UL

## 2025-04-09 ENCOUNTER — OUTPATIENT (OUTPATIENT)
Dept: OUTPATIENT SERVICES | Facility: HOSPITAL | Age: 70
LOS: 1 days | End: 2025-04-09
Payer: MEDICARE

## 2025-04-09 VITALS
WEIGHT: 130.95 LBS | HEART RATE: 56 BPM | RESPIRATION RATE: 18 BRPM | TEMPERATURE: 97 F | OXYGEN SATURATION: 96 % | HEIGHT: 64 IN | SYSTOLIC BLOOD PRESSURE: 170 MMHG | DIASTOLIC BLOOD PRESSURE: 90 MMHG

## 2025-04-09 DIAGNOSIS — C22.0 LIVER CELL CARCINOMA: ICD-10-CM

## 2025-04-09 DIAGNOSIS — Z98.890 OTHER SPECIFIED POSTPROCEDURAL STATES: Chronic | ICD-10-CM

## 2025-04-09 DIAGNOSIS — Z01.818 ENCOUNTER FOR OTHER PREPROCEDURAL EXAMINATION: ICD-10-CM

## 2025-04-09 LAB
ALBUMIN SERPL ELPH-MCNC: 4.5 G/DL — SIGNIFICANT CHANGE UP (ref 3.3–5)
ALP SERPL-CCNC: 91 U/L — SIGNIFICANT CHANGE UP (ref 40–120)
ALT FLD-CCNC: 15 U/L — SIGNIFICANT CHANGE UP (ref 10–45)
ANION GAP SERPL CALC-SCNC: 11 MMOL/L — SIGNIFICANT CHANGE UP (ref 5–17)
APTT BLD: 31.2 SEC — SIGNIFICANT CHANGE UP (ref 24.5–35.6)
AST SERPL-CCNC: 26 U/L — SIGNIFICANT CHANGE UP (ref 10–40)
BILIRUB SERPL-MCNC: 1.1 MG/DL — SIGNIFICANT CHANGE UP (ref 0.2–1.2)
BLD GP AB SCN SERPL QL: NEGATIVE — SIGNIFICANT CHANGE UP
BUN SERPL-MCNC: 9 MG/DL — SIGNIFICANT CHANGE UP (ref 7–23)
CALCIUM SERPL-MCNC: 8.9 MG/DL — SIGNIFICANT CHANGE UP (ref 8.4–10.5)
CHLORIDE SERPL-SCNC: 99 MMOL/L — SIGNIFICANT CHANGE UP (ref 96–108)
CO2 SERPL-SCNC: 25 MMOL/L — SIGNIFICANT CHANGE UP (ref 22–31)
CREAT SERPL-MCNC: 1 MG/DL — SIGNIFICANT CHANGE UP (ref 0.5–1.3)
EGFR: 81 ML/MIN/1.73M2 — SIGNIFICANT CHANGE UP
EGFR: 81 ML/MIN/1.73M2 — SIGNIFICANT CHANGE UP
GLUCOSE SERPL-MCNC: 141 MG/DL — HIGH (ref 70–99)
HCT VFR BLD CALC: 39 % — SIGNIFICANT CHANGE UP (ref 39–50)
HGB BLD-MCNC: 13.4 G/DL — SIGNIFICANT CHANGE UP (ref 13–17)
INR BLD: 1.18 RATIO — HIGH (ref 0.85–1.16)
MCHC RBC-ENTMCNC: 33.6 PG — SIGNIFICANT CHANGE UP (ref 27–34)
MCHC RBC-ENTMCNC: 34.4 G/DL — SIGNIFICANT CHANGE UP (ref 32–36)
MCV RBC AUTO: 97.7 FL — SIGNIFICANT CHANGE UP (ref 80–100)
NRBC BLD AUTO-RTO: 0 /100 WBCS — SIGNIFICANT CHANGE UP (ref 0–0)
PLATELET # BLD AUTO: 87 K/UL — LOW (ref 150–400)
POTASSIUM SERPL-MCNC: 4 MMOL/L — SIGNIFICANT CHANGE UP (ref 3.5–5.3)
POTASSIUM SERPL-SCNC: 4 MMOL/L — SIGNIFICANT CHANGE UP (ref 3.5–5.3)
PROT SERPL-MCNC: 7.1 G/DL — SIGNIFICANT CHANGE UP (ref 6–8.3)
PROTHROM AB SERPL-ACNC: 13.6 SEC — HIGH (ref 9.9–13.4)
RBC # BLD: 3.99 M/UL — LOW (ref 4.2–5.8)
RBC # FLD: 13.1 % — SIGNIFICANT CHANGE UP (ref 10.3–14.5)
RH IG SCN BLD-IMP: POSITIVE — SIGNIFICANT CHANGE UP
SODIUM SERPL-SCNC: 135 MMOL/L — SIGNIFICANT CHANGE UP (ref 135–145)
WBC # BLD: 4.08 K/UL — SIGNIFICANT CHANGE UP (ref 3.8–10.5)
WBC # FLD AUTO: 4.08 K/UL — SIGNIFICANT CHANGE UP (ref 3.8–10.5)

## 2025-04-09 PROCEDURE — 86901 BLOOD TYPING SEROLOGIC RH(D): CPT

## 2025-04-09 PROCEDURE — 85027 COMPLETE CBC AUTOMATED: CPT

## 2025-04-09 PROCEDURE — 86900 BLOOD TYPING SEROLOGIC ABO: CPT

## 2025-04-09 PROCEDURE — 85730 THROMBOPLASTIN TIME PARTIAL: CPT

## 2025-04-09 PROCEDURE — 86850 RBC ANTIBODY SCREEN: CPT

## 2025-04-09 PROCEDURE — G0463: CPT

## 2025-04-09 PROCEDURE — 85610 PROTHROMBIN TIME: CPT

## 2025-04-09 PROCEDURE — 36415 COLL VENOUS BLD VENIPUNCTURE: CPT

## 2025-04-09 PROCEDURE — 80053 COMPREHEN METABOLIC PANEL: CPT

## 2025-04-09 NOTE — H&P PST ADULT - NSICDXPASTSURGICALHX_GEN_ALL_CORE_FT
PAST SURGICAL HISTORY:  H/O endoscopy      PAST SURGICAL HISTORY:  H/O endoscopy     History of ablation of neoplasm of liver     Status post Mohs surgery

## 2025-04-09 NOTE — H&P PST ADULT - OTHER CARE PROVIDERS
Dr. Barrett Lin, Dr. Michael Rendon (pain management) Dr. Marcus Elias (GI) GI: Dr. Marcus Elias [phone], Pain management: Dr. Michael Rendon, Cardiologist: Dr. Barrett Sr GI: Dr. Marcus Elias [phone], Pain management: Dr. Michael Sanchez , Cardiologist: Dr. Barrett Sr [phone]

## 2025-04-09 NOTE — H&P PST ADULT - ASSESSMENT
DASI score: 6.5  DASI activity: most house chores, babysits grandchildren ages 3 and 5  Loose teeth or denture: denies

## 2025-04-09 NOTE — H&P PST ADULT - NSICDXPASTMEDICALHX_GEN_ALL_CORE_FT
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

## 2025-04-09 NOTE — H&P PST ADULT - PROBLEM SELECTOR PLAN 1
Mapping angiogram/perfusion scan on 4/14/25 with Dr. John Marte.  Pre-op instructions given. Questions answered. Mapping angiogram/perfusion scan on 4/14/25 with Dr. John Marte.    Pre-op instructions given. Questions answered.

## 2025-04-09 NOTE — H&P PST ADULT - HISTORY OF PRESENT ILLNESS
This is a 67 year old male with past medical history of Hepatitis C (treated with Harvoni)  liver Cirrhosis, Etoh abuse ( last drink 2014), was found to have 4cm HCC on recent MRI. Pt underwent  mapping procedure and perfusion scan with anesthesia in 2/24/22 had f/u IR evaluation- scheduled for mapping procedure with Dr. Fung on 12/21/22    **Denies any fever, chills, N/V, abdominal pain or sick contacts  ** Covid test on 12/19/22 @ [facility]  ** Covid vaccinated- Pfizer series   70 year old male w/ PMHx Hepatitis C (treated with Harvoni), Liver Cirrhosis and esophageal varices (dx 2014; last episode of decompensation/paracentesis in 2014), ETOH abuse (last drink before 2014), thrombocytopenia, lumbar herniated disc (sees by pain management).  He was noted to have liver lesion on screening imaging s/p liver directed therapy in 12/2021. He is now s/p Y90 to segment 5 on 3/3/22 and Y90 to segment 8 on 1/18/23. Most recent imaging performed in 3/2025 revealed " Cirrhosis with portal hypertension. A 1.0 cm LI-RADS 4 observation within segment 7 (probably HCC), mildly increased in size from 10/10/2024." He now presents to PST prior to scheduled mapping angiogram/perfusion scan on 4/14/25 with Dr. John Marte.  Reports chronic fatigue; however denies chest pain, palpitations, sob/ayon, leg swelling, unintentional weight loss/gain, jaundice.?   70 year old male w/ PMHx Hepatitis C (treated with Harvoni), Liver Cirrhosis and esophageal varices (dx 2014; last episode of decompensation/paracentesis in 2014), ETOH abuse (last drink before 2014), thrombocytopenia, lumbar herniated disc (seen by pain management).  He was noted to have liver lesion on screening imaging s/p liver directed therapy in 12/2021. He is now s/p Y90 to segment 5 on 3/3/22 and Y90 to segment 8 on 1/18/23. Most recent imaging performed in 3/2025 revealed " Cirrhosis with portal hypertension. A 1.0 cm LI-RADS 4 observation within segment 7 (probably HCC), mildly increased in size from 10/10/2024." He now presents to PST prior to scheduled mapping angiogram/perfusion scan on 4/14/25 with Dr. John Marte.  Reports chronic fatigue; however denies chest pain, palpitations, sob/ayon, leg swelling, unintentional weight loss/gain, jaundice.?

## 2025-04-09 NOTE — H&P PST ADULT - RESPIRATORY RATE (BREATHS/MIN)
Called and given message to patient per Dr. Canales.  He  Agreed and v/u.  Stated he has had no alcohol.  Also needed a new RX sent in . . Sent new RX to pharmacy.  Also magnesium order to repeat in 2 weeks.     18

## 2025-04-10 PROBLEM — M54.50 LOW BACK PAIN, UNSPECIFIED: Chronic | Status: INACTIVE | Noted: 2022-02-16 | Resolved: 2025-04-09

## 2025-04-10 PROBLEM — K74.60 UNSPECIFIED CIRRHOSIS OF LIVER: Chronic | Status: INACTIVE | Noted: 2020-03-12 | Resolved: 2025-04-09

## 2025-04-11 ENCOUNTER — NON-APPOINTMENT (OUTPATIENT)
Age: 70
End: 2025-04-11

## 2025-04-14 ENCOUNTER — TRANSCRIPTION ENCOUNTER (OUTPATIENT)
Age: 70
End: 2025-04-14

## 2025-04-14 ENCOUNTER — RESULT REVIEW (OUTPATIENT)
Age: 70
End: 2025-04-14

## 2025-04-14 ENCOUNTER — APPOINTMENT (OUTPATIENT)
Dept: NUCLEAR MEDICINE | Facility: HOSPITAL | Age: 70
End: 2025-04-14

## 2025-04-14 ENCOUNTER — OUTPATIENT (OUTPATIENT)
Dept: OUTPATIENT SERVICES | Facility: HOSPITAL | Age: 70
LOS: 1 days | End: 2025-04-14
Payer: MEDICARE

## 2025-04-14 VITALS
DIASTOLIC BLOOD PRESSURE: 92 MMHG | TEMPERATURE: 98 F | SYSTOLIC BLOOD PRESSURE: 217 MMHG | RESPIRATION RATE: 18 BRPM | WEIGHT: 130.07 LBS | OXYGEN SATURATION: 100 % | HEART RATE: 46 BPM

## 2025-04-14 DIAGNOSIS — Z98.890 OTHER SPECIFIED POSTPROCEDURAL STATES: Chronic | ICD-10-CM

## 2025-04-14 DIAGNOSIS — C22.0 LIVER CELL CARCINOMA: ICD-10-CM

## 2025-04-14 PROCEDURE — 75726 ARTERY X-RAYS ABDOMEN: CPT

## 2025-04-14 PROCEDURE — 78802 RP LOCLZJ TUM WHBDY 1 D IMG: CPT

## 2025-04-14 PROCEDURE — 75774 ARTERY X-RAY EACH VESSEL: CPT

## 2025-04-14 PROCEDURE — A9540: CPT

## 2025-04-14 PROCEDURE — 75726 ARTERY X-RAYS ABDOMEN: CPT | Mod: 26

## 2025-04-14 PROCEDURE — 36247 INS CATH ABD/L-EXT ART 3RD: CPT | Mod: RT

## 2025-04-14 PROCEDURE — 78803 RP LOCLZJ TUM SPECT 1 AREA: CPT | Mod: MC

## 2025-04-14 PROCEDURE — 78803 RP LOCLZJ TUM SPECT 1 AREA: CPT | Mod: 26

## 2025-04-14 PROCEDURE — C1769: CPT

## 2025-04-14 PROCEDURE — C1894: CPT

## 2025-04-14 PROCEDURE — 36247 INS CATH ABD/L-EXT ART 3RD: CPT

## 2025-04-14 PROCEDURE — 75774 ARTERY X-RAY EACH VESSEL: CPT | Mod: 26

## 2025-04-14 PROCEDURE — C1887: CPT

## 2025-04-14 PROCEDURE — C1760: CPT

## 2025-04-14 PROCEDURE — 78800 RP LOCLZJ TUM 1 AREA 1 D IMG: CPT | Mod: 26,59

## 2025-04-14 PROCEDURE — 76937 US GUIDE VASCULAR ACCESS: CPT | Mod: 26

## 2025-04-14 PROCEDURE — 76937 US GUIDE VASCULAR ACCESS: CPT

## 2025-04-14 NOTE — ASU DISCHARGE PLAN (ADULT/PEDIATRIC) - SIGNS AND SYMPTOMS OF INFECTION: FEVER, REDNESS, SWELLING, FOUL SMELLING DISCHARGE
Physically, everything looks good today.      All of your blood work looks fine.  Soon as we get the results of the testosterone tests, we will forward them to you.      Continue to eat a healthy diet.  Be careful with portion sizes.  Includes lots of fresh fruits, vegetables, whole grains, lean proteins.  See info below.    Keep hydrated.  Be sure to drink at least 8-10, 8 oz, glasses of water every day.    Stay active.  Try to do some sort of physical activity every day.  Nothing outrageous, just try walking for 10-15 minutes each day.     You may want to start taking an OTC fish oil supplement to help triglyceride levels.  Take two capsules in the morning, two in the evening.      Will place referral to Gastroenterology, Dr. Serrato, today.  Feel free to follow-up with him at your convenience.  Next summer, we will be due to repeat your colonoscopy.   Statement Selected

## 2025-04-14 NOTE — ASU PATIENT PROFILE, ADULT - NSICDXPASTSURGICALHX_GEN_ALL_CORE_FT
PAST SURGICAL HISTORY:  H/O endoscopy     History of ablation of neoplasm of liver     Status post Mohs surgery

## 2025-04-14 NOTE — PROCEDURE NOTE - PLAN
Post procedure imaging in nuclear medicine department  1 hour bedrest post procedure  Return for planned radioembolization

## 2025-04-14 NOTE — PRE PROCEDURE NOTE - PRE PROCEDURE EVALUATION
70M hx of hepC cirrhosis, esophageal varices, thrombocytopenia. He is s/p Y90 to segment 5 on 3/3/2022 and Y90 to segment 8 on 1/18/2023 presenting with LI-RADS4 lesion in segment 7 that has increased in size from prior interval imaging, consistent with recurrent disease here for mapping procedure prior to radioembolization.    Complete Blood Count (04.09.25 @ 15:23)    Auto NRBC: 0 /100 WBCs   WBC Count: 4.08 K/uL   RBC Count: 3.99 M/uL   Hemoglobin: 13.4 g/dL   Hematocrit: 39.0 %   Mean Cell Volume: 97.7 fl   Mean Cell Hemoglobin: 33.6 pg   Mean Cell Hemoglobin Conc: 34.4 g/dL   Red Cell Distrib Width: 13.1 %   Platelet Count - Automated: 87: Test Repeated Specimen integrity verified. K/uL    Comprehensive Metabolic Panel (04.09.25 @ 15:23)    Sodium: 135 mmol/L   Potassium: 4.0 mmol/L   Chloride: 99 mmol/L   Carbon Dioxide: 25 mmol/L   Anion Gap: 11 mmol/L   Blood Urea Nitrogen: 9 mg/dL   Creatinine: 1.00 mg/dL   Glucose: 141 mg/dL   Calcium: 8.9 mg/dL   Protein Total: 7.1 g/dL   Albumin: 4.5 g/dL   Bilirubin Total: 1.1 mg/dL   Alkaline Phosphatase: 91 U/L   Aspartate Aminotransferase (AST/SGOT): 26 U/L   Alanine Aminotransferase (ALT/SGPT): 15 U/L   eGFR: 81

## 2025-04-14 NOTE — ASU DISCHARGE PLAN (ADULT/PEDIATRIC) - FINANCIAL ASSISTANCE
[facility] provides services at a reduced cost to those who are determined to be eligible through [facility]’s financial assistance program. Information regarding [facility]’s financial assistance program can be found by going to [email or web address] or by calling [phone].

## 2025-04-14 NOTE — PROCEDURE NOTE - PROCEDURE DATE TIME, MLM
Verified Results  (1) GIARDIA LAMBLIA [id number] 10:00AM Diane Castañeda Order Number: [id number]     Test Name Result Flag Reference   GIARDIA LAMBLIA Negative  Negative   Performed at:  [address] [place] [address]  [id number]  : Austyn Mcgarry MD, Phone:  [phone]       Discussion/Summary   GIARDIA IN STOOL NEGATIVE
14-Apr-2025 11:18

## 2025-04-14 NOTE — PROCEDURE NOTE - SEDATION
Detail Level: Zone Duration Of Freeze Thaw-Cycle (Seconds): 1 Aperture Size (Optional): A Show Applicator Variable?: Yes Render Note In Bullet Format When Appropriate: No Consent: The patient's consent was obtained including but not limited to risks of crusting, scabbing, blistering, scarring, darker or lighter pigmentary change, recurrence, incomplete removal and infection. Number Of Freeze-Thaw Cycles: 1 freeze-thaw cycle Post-Care Instructions: I reviewed with the patient in detail post-care instructions. Patient is to wear sunprotection, and avoid picking at any of the treated lesions. Pt may apply Vaseline to crusted or scabbing areas. Provided by Anesthesia Department

## 2025-04-14 NOTE — ASU DISCHARGE PLAN (ADULT/PEDIATRIC) - ASU DC SPECIAL INSTRUCTIONSFT
Hepatic angiogram/mapping discharge instructions:    - There may be a small area of bruising around the groin site.                   - You may resume your normal diet.  - You may resume your normal medications as instructed.  - Do not drive, engage in heavy lifting or strenuous activity, or drink any alcoholic beverages for the next 24 hours.     If you have a problem that you believe requires IMMEDIATE attention, please go to your NEAREST Emergency Room.    Notify your primary physician and/or Interventional Radiology IMMEDIATELY if you experience any of the following       - Fever of 101F or 38C       - Chills or Rigors/ Shakes       - Worsening Pain       - Blood soaked bandages or worsening bleeding       - Lightheadedness and/or dizziness upon standing       - Chest Pain/ Tightness       - Shortness of Breath       - Difficulty walking    Follow Up Instructions:   - Please call the IR Office at [phone] with any questions about your upcoming radioembolization (Y90) treatment.

## 2025-04-14 NOTE — PROCEDURE NOTE - PROCEDURE FINDINGS AND DETAILS
Patient common femoral artery  Normal celiac branching pattern  Conventional angiogram and cone beam CT of right posterior hepatic artery demonstrates focal enhancement in region of known tumor burden  Administration of 3.2mCi T99-MAA  5Fr Celt closure device Patient common femoral artery  Normal celiac branching pattern  Conventional angiogram and cone beam CT of right posterior hepatic artery demonstrates focal enhancement in region of known tumor burden  Administration of 3.2mCi T99-MAA via right posterior hepatic artery  5Fr Celt closure device

## 2025-04-14 NOTE — ASU DISCHARGE PLAN (ADULT/PEDIATRIC) - NURSING INSTRUCTIONS
Please feel free to contact us at [phone] if any problems arise. After 6PM, Monday through Friday, on weekends and on holidays, please call [phone] and ask for the radiology resident on call to be paged.

## 2025-04-14 NOTE — ASU DISCHARGE PLAN (ADULT/PEDIATRIC) - NS MD DC FALL RISK RISK
For information on Fall & Injury Prevention, visit: [email or web address] OR  [email or web address] OR  https://www.cdc.gov/steadi/patient.html

## 2025-04-14 NOTE — ASU PATIENT PROFILE, ADULT - NSICDXPASTMEDICALHX_GEN_ALL_CORE_FT
PAST MEDICAL HISTORY:  Esophageal varices     H/O basal cell carcinoma excision     H/O thrombocytopenia     History of cirrhosis of liver     Liver cell carcinoma     Lumbar herniated disc Follows with pain management    Macrocytic anemia     Moderate anxiety     OA (osteoarthritis) Right knee    Pneumonia      Nasal Turnover Hinge Flap Text: The defect edges were debeveled with a #15 scalpel blade.  Given the size, depth, location of the defect and the defect being full thickness a nasal turnover hinge flap was deemed most appropriate.  Using a sterile surgical marker, an appropriate hinge flap was drawn incorporating the defect. The area thus outlined was incised with a #15 scalpel blade. The flap was designed to recreate the nasal mucosal lining and the alar rim. The skin margins were undermined to an appropriate distance in all directions utilizing iris scissors.

## 2025-04-23 PROBLEM — Z87.19 PERSONAL HISTORY OF OTHER DISEASES OF THE DIGESTIVE SYSTEM: Chronic | Status: ACTIVE | Noted: 2025-04-09

## 2025-04-23 PROBLEM — C22.0 LIVER CELL CARCINOMA: Chronic | Status: ACTIVE | Noted: 2025-04-09

## 2025-04-23 PROBLEM — Z98.890 OTHER SPECIFIED POSTPROCEDURAL STATES: Chronic | Status: ACTIVE | Noted: 2025-04-09

## 2025-05-02 ENCOUNTER — RX RENEWAL (OUTPATIENT)
Age: 70
End: 2025-05-02

## 2025-05-05 ENCOUNTER — RESULT REVIEW (OUTPATIENT)
Age: 70
End: 2025-05-05

## 2025-05-05 ENCOUNTER — TRANSCRIPTION ENCOUNTER (OUTPATIENT)
Age: 70
End: 2025-05-05

## 2025-05-05 ENCOUNTER — OUTPATIENT (OUTPATIENT)
Dept: OUTPATIENT SERVICES | Facility: HOSPITAL | Age: 70
LOS: 1 days | End: 2025-05-05
Payer: MEDICARE

## 2025-05-05 VITALS
OXYGEN SATURATION: 99 % | SYSTOLIC BLOOD PRESSURE: 154 MMHG | DIASTOLIC BLOOD PRESSURE: 98 MMHG | WEIGHT: 130.07 LBS | HEIGHT: 65 IN | HEART RATE: 50 BPM | RESPIRATION RATE: 15 BRPM | TEMPERATURE: 97 F

## 2025-05-05 VITALS
DIASTOLIC BLOOD PRESSURE: 85 MMHG | RESPIRATION RATE: 20 BRPM | SYSTOLIC BLOOD PRESSURE: 146 MMHG | HEART RATE: 52 BPM | OXYGEN SATURATION: 96 %

## 2025-05-05 DIAGNOSIS — Z98.890 OTHER SPECIFIED POSTPROCEDURAL STATES: Chronic | ICD-10-CM

## 2025-05-05 DIAGNOSIS — C22.0 LIVER CELL CARCINOMA: ICD-10-CM

## 2025-05-05 LAB
ALBUMIN SERPL ELPH-MCNC: 4.3 G/DL — SIGNIFICANT CHANGE UP (ref 3.3–5)
ALP SERPL-CCNC: 76 U/L — SIGNIFICANT CHANGE UP (ref 40–120)
ALT FLD-CCNC: 14 U/L — SIGNIFICANT CHANGE UP (ref 10–45)
ANION GAP SERPL CALC-SCNC: 14 MMOL/L — SIGNIFICANT CHANGE UP (ref 5–17)
APTT BLD: 32.1 SEC — SIGNIFICANT CHANGE UP (ref 26.1–36.8)
AST SERPL-CCNC: 27 U/L — SIGNIFICANT CHANGE UP (ref 10–40)
BILIRUB SERPL-MCNC: 1.2 MG/DL — SIGNIFICANT CHANGE UP (ref 0.2–1.2)
BLD GP AB SCN SERPL QL: NEGATIVE — SIGNIFICANT CHANGE UP
BUN SERPL-MCNC: 11 MG/DL — SIGNIFICANT CHANGE UP (ref 7–23)
CALCIUM SERPL-MCNC: 9.2 MG/DL — SIGNIFICANT CHANGE UP (ref 8.4–10.5)
CHLORIDE SERPL-SCNC: 99 MMOL/L — SIGNIFICANT CHANGE UP (ref 96–108)
CO2 SERPL-SCNC: 25 MMOL/L — SIGNIFICANT CHANGE UP (ref 22–31)
CREAT SERPL-MCNC: 0.96 MG/DL — SIGNIFICANT CHANGE UP (ref 0.5–1.3)
EGFR: 85 ML/MIN/1.73M2 — SIGNIFICANT CHANGE UP
EGFR: 85 ML/MIN/1.73M2 — SIGNIFICANT CHANGE UP
GLUCOSE SERPL-MCNC: 117 MG/DL — HIGH (ref 70–99)
HCT VFR BLD CALC: 36.6 % — LOW (ref 39–50)
HGB BLD-MCNC: 13 G/DL — SIGNIFICANT CHANGE UP (ref 13–17)
INR BLD: 1.25 RATIO — HIGH (ref 0.85–1.16)
MCHC RBC-ENTMCNC: 34.9 PG — HIGH (ref 27–34)
MCHC RBC-ENTMCNC: 35.5 G/DL — SIGNIFICANT CHANGE UP (ref 32–36)
MCV RBC AUTO: 98.1 FL — SIGNIFICANT CHANGE UP (ref 80–100)
NRBC BLD AUTO-RTO: 0 /100 WBCS — SIGNIFICANT CHANGE UP (ref 0–0)
PLATELET # BLD AUTO: 70 K/UL — LOW (ref 150–400)
POTASSIUM SERPL-MCNC: 4.4 MMOL/L — SIGNIFICANT CHANGE UP (ref 3.5–5.3)
POTASSIUM SERPL-SCNC: 4.4 MMOL/L — SIGNIFICANT CHANGE UP (ref 3.5–5.3)
PROT SERPL-MCNC: 7 G/DL — SIGNIFICANT CHANGE UP (ref 6–8.3)
PROTHROM AB SERPL-ACNC: 14.3 SEC — HIGH (ref 9.9–13.4)
RBC # BLD: 3.73 M/UL — LOW (ref 4.2–5.8)
RBC # FLD: 13.5 % — SIGNIFICANT CHANGE UP (ref 10.3–14.5)
RH IG SCN BLD-IMP: POSITIVE — SIGNIFICANT CHANGE UP
SODIUM SERPL-SCNC: 138 MMOL/L — SIGNIFICANT CHANGE UP (ref 135–145)
WBC # BLD: 3.42 K/UL — LOW (ref 3.8–10.5)
WBC # FLD AUTO: 3.42 K/UL — LOW (ref 3.8–10.5)

## 2025-05-05 PROCEDURE — C1760: CPT

## 2025-05-05 PROCEDURE — 36247 INS CATH ABD/L-EXT ART 3RD: CPT

## 2025-05-05 PROCEDURE — C1894: CPT

## 2025-05-05 PROCEDURE — 37243 VASC EMBOLIZE/OCCLUDE ORGAN: CPT

## 2025-05-05 PROCEDURE — 86900 BLOOD TYPING SEROLOGIC ABO: CPT

## 2025-05-05 PROCEDURE — 76937 US GUIDE VASCULAR ACCESS: CPT

## 2025-05-05 PROCEDURE — 85730 THROMBOPLASTIN TIME PARTIAL: CPT

## 2025-05-05 PROCEDURE — C1769: CPT

## 2025-05-05 PROCEDURE — C2616: CPT

## 2025-05-05 PROCEDURE — 86850 RBC ANTIBODY SCREEN: CPT

## 2025-05-05 PROCEDURE — C1887: CPT

## 2025-05-05 PROCEDURE — 85610 PROTHROMBIN TIME: CPT

## 2025-05-05 PROCEDURE — 85027 COMPLETE CBC AUTOMATED: CPT

## 2025-05-05 PROCEDURE — 36247 INS CATH ABD/L-EXT ART 3RD: CPT | Mod: RT

## 2025-05-05 PROCEDURE — 76937 US GUIDE VASCULAR ACCESS: CPT | Mod: 26

## 2025-05-05 PROCEDURE — 80053 COMPREHEN METABOLIC PANEL: CPT

## 2025-05-05 PROCEDURE — 86901 BLOOD TYPING SEROLOGIC RH(D): CPT

## 2025-05-05 NOTE — ASU DISCHARGE PLAN (ADULT/PEDIATRIC) - NURSING INSTRUCTIONS
Please feel free to contact us at [phone] if any problems arise. After 6PM, Monday through Friday, on weekends and on holidays, please call [phone] and ask for the radiology resident on call to be paged.

## 2025-05-05 NOTE — ASU PATIENT PROFILE, ADULT - NSICDXPASTMEDICALHX_GEN_ALL_CORE_FT
PAST MEDICAL HISTORY:  Esophageal varices     H/O basal cell carcinoma excision     H/O thrombocytopenia     History of cirrhosis of liver     Liver cell carcinoma     Lumbar herniated disc Follows with pain management    Macrocytic anemia     Moderate anxiety     OA (osteoarthritis) Right knee    Pneumonia

## 2025-05-05 NOTE — ASU DISCHARGE PLAN (ADULT/PEDIATRIC) - FINANCIAL ASSISTANCE
[facility] provides services at a reduced cost to those who are determined to be eligible through [facility]’s financial assistance program. Information regarding [facility]’s financial assistance program can be found by going to [email or web address] or by calling [phone].

## 2025-05-05 NOTE — PRE PROCEDURE NOTE - PRE PROCEDURE EVALUATION
------------------------------------------------------------  Interventional Radiology Pre-Procedure Note  ------------------------------------------------------------    Indication: 70y Male with history of HCV and cirrhosis, s/p segment 5 and 8 hcc treatment, now with enlarging LR4 segment 7 HCC. Plan for Y90 treatment of segment 7 hepatic lesion.     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    Lumbar back pain    Lumbar herniated disc    OA (osteoarthritis)    Moderate anxiety    Esophageal varices    Macrocytic anemia    H/O thrombocytopenia    Pneumonia    H/O basal cell carcinoma excision    Liver cell carcinoma    History of cirrhosis of liver        Allergies: shellfish (Other (Severe))  penicillins (Other (Unknown))      Medications:        Vital Signs:   T(F): 97 (12:11), Max: 97 (12:11)  HR: 50 (12:11)  BP: 154/98 (12:11)  RR: 15 (12:11)  SpO2: 99% (12:11)    Labs:           13.0  3.42)-----(70     (05-05-25 @ 12:43)         36.6     138 | 99 | 11  --------------------< 117     (05-05-25 @ 12:43)  4.4 | 25 | 0.96       PT: 14.3[H] 05-05-25 @ 12:43  aPTT: 32.1 05-05-25 @ 12:43   INR: 1.25[H] 05-05-25 @ 12:43    Imaging: procedure imaging 4/14/25 was reviewed    Consent: Risks/benefits/alternatives were explained and informed written consent was obtained.     Procedure Plan: Plan for Y90 radioembolization of segment 7 hepatic lesion.

## 2025-05-05 NOTE — PROCEDURE NOTE - PROCEDURE FINDINGS AND DETAILS
Right common femoral artery access.   The posterior branch of the right hepatic artery was selected. Supply to the segment 7 tumor was confirmed on angiography and coned beam CT. Status post successful Y90 radioembolization.   The Celt closure device was used for hemostasis. Dry sterile dressing was applied.

## 2025-05-05 NOTE — ASU DISCHARGE PLAN (ADULT/PEDIATRIC) - ASU DC SPECIAL INSTRUCTIONSFT
Post [facility] Discharge Instructions    - You underwent a radioembolization to treat your liver tumor on 5/5/25 by Dr. Marte.     - Monitor wrist/ groin site for symptoms of bleeding, hard area underneath the skin, bruising, numbness, intense pain, or inability to move.  If you have any of these symptoms, contact your doctor and seek immediate medical attention    - You may have mild abdominal pain, nausea, loss of appetite, fatigue, and/or low grade fever.  These are normal after your procedure and they should resolve within 3-5 days.  Please call Interventional Radiology if you have a fever > 102.0 F.  If you have persistent nausea or vomiting, contact IR and we can prescribe anti-nausea medication.     - Please report severe pain, confusion, yellowing of the skin, or abdominal swelling.    - Please refer to the " Post [facility] Radiation Safety Instructions" sheet that was provided.  Please adhere to them for 72 hours after your procedure.     Activity:     --Please keep the dressing in place for 24 hours.  You may shower as usual after 24 hours.  Do not tub bathe or swim until the site is completely healed.       --You may feel a small bump or pea-sized lump at the puncture site.  This is normal due to vessel closure device and will resolve over time.      --There is rarely bleeding after the procedure, but if you notice sudden swelling/bruising or active bleeding at the puncture site, apply direct pressure to the area by placing your fingers and a clean paper towel/cloth over the site.  Contact Interventional Radiology office.      --For the next 24 hours, avoid alcoholic beverages, making important decisions, driving or operating heavy machinery or anything that requires coordination and balance.      --You may resume activities as tolerated after 24 hours.      --No heavy lifting (>5 lbs) or strenuous activity for 7 days.     Follow up    - A follow up phone call will be performed by the IR clinic NP  the day after the procedure.     - Blood work is to be performed 2 weeks after your procedure.  The orders will be entered in the electronic medical record and will be available to be viewed by [facility] lab team. To locate the closest [facility] lab, call [phone]. Please inform us if you plan to have labwork performed at a NON-[facility] lab, as we will need to fax a script.     - You will need to follow up with your IR doctor 1 month after your procedure. This may have been scheduled already, can call IR booking office to confirm appointment details @ [phone].     - Follow up with your hepatologist or oncologist in 2-4 weeks.     - Post procedure imaging study is to be performed 2-3 months post procedure (CT or MRI).  You will be given a prescription for this at your initial 1 month follow up visit. Our booking office will obtain authorization from your insurance company, if required.    - Please contact the office with any questions or concerns at [phone] from 8 am to 5 pm, Monday - Friday.  If you call after office hours, you will be connected with the answering service who will connect you with the on-call physician.

## 2025-05-06 ENCOUNTER — APPOINTMENT (OUTPATIENT)
Dept: INTERVENTIONAL RADIOLOGY/VASCULAR | Facility: CLINIC | Age: 70
End: 2025-05-06

## 2025-05-06 DIAGNOSIS — C22.0 LIVER CELL CARCINOMA: ICD-10-CM

## 2025-05-16 LAB
ALBUMIN SERPL ELPH-MCNC: 4.1 G/DL
ALP BLD-CCNC: 76 U/L
ALT SERPL-CCNC: 21 U/L
ANION GAP SERPL CALC-SCNC: 10 MMOL/L
AST SERPL-CCNC: 40 U/L
BILIRUB SERPL-MCNC: 1 MG/DL
BUN SERPL-MCNC: 11 MG/DL
CALCIUM SERPL-MCNC: 9 MG/DL
CHLORIDE SERPL-SCNC: 99 MMOL/L
CO2 SERPL-SCNC: 26 MMOL/L
CREAT SERPL-MCNC: 0.91 MG/DL
EGFRCR SERPLBLD CKD-EPI 2021: 91 ML/MIN/1.73M2
GLUCOSE SERPL-MCNC: 123 MG/DL
HCT VFR BLD CALC: 37.1 %
HGB BLD-MCNC: 12.3 G/DL
MCHC RBC-ENTMCNC: 33.2 G/DL
MCHC RBC-ENTMCNC: 34.2 PG
MCV RBC AUTO: 103.1 FL
PLATELET # BLD AUTO: 90 K/UL
POTASSIUM SERPL-SCNC: 4.1 MMOL/L
PROT SERPL-MCNC: 6.6 G/DL
RBC # BLD: 3.6 M/UL
RBC # FLD: 14.1 %
SODIUM SERPL-SCNC: 135 MMOL/L
WBC # FLD AUTO: 3.38 K/UL

## 2025-05-23 ENCOUNTER — APPOINTMENT (OUTPATIENT)
Dept: GASTROENTEROLOGY | Facility: CLINIC | Age: 70
End: 2025-05-23
Payer: MEDICARE

## 2025-05-23 VITALS — WEIGHT: 125 LBS | HEIGHT: 63 IN | BODY MASS INDEX: 22.15 KG/M2

## 2025-05-23 DIAGNOSIS — Z09 ENCOUNTER FOR FOLLOW-UP EXAMINATION AFTER COMPLETED TREATMENT FOR CONDITIONS OTHER THAN MALIGNANT NEOPLASM: ICD-10-CM

## 2025-05-23 DIAGNOSIS — K74.60 UNSPECIFIED CIRRHOSIS OF LIVER: ICD-10-CM

## 2025-05-23 DIAGNOSIS — C22.0 LIVER CELL CARCINOMA: ICD-10-CM

## 2025-05-23 PROCEDURE — G2211 COMPLEX E/M VISIT ADD ON: CPT

## 2025-05-23 PROCEDURE — 99213 OFFICE O/P EST LOW 20 MIN: CPT

## 2025-06-04 ENCOUNTER — APPOINTMENT (OUTPATIENT)
Dept: INTERVENTIONAL RADIOLOGY/VASCULAR | Facility: CLINIC | Age: 70
End: 2025-06-04

## 2025-06-04 DIAGNOSIS — K74.60 UNSPECIFIED CIRRHOSIS OF LIVER: ICD-10-CM

## 2025-06-04 DIAGNOSIS — I85.10 UNSPECIFIED CIRRHOSIS OF LIVER: ICD-10-CM

## 2025-06-04 DIAGNOSIS — R77.2 ABNORMALITY OF ALPHAFETOPROTEIN: ICD-10-CM

## 2025-06-04 DIAGNOSIS — C22.0 LIVER CELL CARCINOMA: ICD-10-CM

## 2025-06-06 ENCOUNTER — APPOINTMENT (OUTPATIENT)
Dept: GASTROENTEROLOGY | Facility: CLINIC | Age: 70
End: 2025-06-06
Payer: MEDICARE

## 2025-06-06 VITALS
DIASTOLIC BLOOD PRESSURE: 72 MMHG | WEIGHT: 125 LBS | SYSTOLIC BLOOD PRESSURE: 128 MMHG | HEIGHT: 63 IN | BODY MASS INDEX: 22.15 KG/M2

## 2025-06-06 PROCEDURE — 99213 OFFICE O/P EST LOW 20 MIN: CPT

## 2025-07-02 ENCOUNTER — APPOINTMENT (OUTPATIENT)
Dept: GASTROENTEROLOGY | Facility: CLINIC | Age: 70
End: 2025-07-02

## 2025-08-08 ENCOUNTER — APPOINTMENT (OUTPATIENT)
Dept: MRI IMAGING | Facility: CLINIC | Age: 70
End: 2025-08-08

## 2025-08-08 ENCOUNTER — OUTPATIENT (OUTPATIENT)
Dept: OUTPATIENT SERVICES | Facility: HOSPITAL | Age: 70
LOS: 1 days | End: 2025-08-08
Payer: MEDICARE

## 2025-08-08 DIAGNOSIS — Z98.890 OTHER SPECIFIED POSTPROCEDURAL STATES: Chronic | ICD-10-CM

## 2025-08-08 DIAGNOSIS — C22.0 LIVER CELL CARCINOMA: ICD-10-CM

## 2025-08-08 PROCEDURE — A9585: CPT

## 2025-08-08 PROCEDURE — 74183 MRI ABD W/O CNTR FLWD CNTR: CPT | Mod: 26

## 2025-08-08 PROCEDURE — 74183 MRI ABD W/O CNTR FLWD CNTR: CPT

## 2025-08-14 ENCOUNTER — NON-APPOINTMENT (OUTPATIENT)
Age: 70
End: 2025-08-14

## 2025-08-14 ENCOUNTER — APPOINTMENT (OUTPATIENT)
Dept: GASTROENTEROLOGY | Facility: CLINIC | Age: 70
End: 2025-08-14
Payer: MEDICARE

## 2025-08-14 VITALS
HEIGHT: 63 IN | SYSTOLIC BLOOD PRESSURE: 112 MMHG | DIASTOLIC BLOOD PRESSURE: 70 MMHG | WEIGHT: 125 LBS | BODY MASS INDEX: 22.15 KG/M2

## 2025-08-14 DIAGNOSIS — K74.60 UNSPECIFIED CIRRHOSIS OF LIVER: ICD-10-CM

## 2025-08-14 PROCEDURE — 99213 OFFICE O/P EST LOW 20 MIN: CPT

## 2025-08-19 LAB
AFP-TM SERPL-MCNC: 6.8 NG/ML
ALBUMIN SERPL ELPH-MCNC: 4 G/DL
ALP BLD-CCNC: 101 U/L
ALT SERPL-CCNC: 21 U/L
ANION GAP SERPL CALC-SCNC: 11 MMOL/L
AST SERPL-CCNC: 40 U/L
BILIRUB SERPL-MCNC: 1.1 MG/DL
BUN SERPL-MCNC: 10 MG/DL
CALCIUM SERPL-MCNC: 8.7 MG/DL
CHLORIDE SERPL-SCNC: 99 MMOL/L
CO2 SERPL-SCNC: 24 MMOL/L
CREAT SERPL-MCNC: 0.89 MG/DL
EGFRCR SERPLBLD CKD-EPI 2021: 92 ML/MIN/1.73M2
HCT VFR BLD CALC: 36 %
HGB BLD-MCNC: 12.3 G/DL
INR PPP: 1.19 RATIO
MCHC RBC-ENTMCNC: 34.2 G/DL
MCHC RBC-ENTMCNC: 34.2 PG
MCV RBC AUTO: 100 FL
PLATELET # BLD AUTO: 65 K/UL
POTASSIUM SERPL-SCNC: 4.1 MMOL/L
PROT SERPL-MCNC: 6.5 G/DL
PT BLD: 14 SEC
RBC # BLD: 3.6 M/UL
RBC # FLD: 13.5 %
SODIUM SERPL-SCNC: 135 MMOL/L
WBC # FLD AUTO: 2.66 K/UL

## 2025-08-21 ENCOUNTER — APPOINTMENT (OUTPATIENT)
Dept: INTERVENTIONAL RADIOLOGY/VASCULAR | Facility: CLINIC | Age: 70
End: 2025-08-21

## 2025-08-21 DIAGNOSIS — C22.0 LIVER CELL CARCINOMA: ICD-10-CM
